# Patient Record
Sex: FEMALE | Race: WHITE | Employment: FULL TIME | URBAN - METROPOLITAN AREA
[De-identification: names, ages, dates, MRNs, and addresses within clinical notes are randomized per-mention and may not be internally consistent; named-entity substitution may affect disease eponyms.]

---

## 2021-03-12 LAB
C. TRACHOMATIS, EXTERNAL RESULT: NEGATIVE
N. GONORRHOEAE, EXTERNAL RESULT: NEGATIVE

## 2021-03-30 LAB
ABO, EXTERNAL RESULT: NORMAL
HEP B, EXTERNAL RESULT: NEGATIVE
HIV, EXTERNAL RESULT: NON REACTIVE
RH FACTOR, EXTERNAL RESULT: NEGATIVE
RPR, EXTERNAL RESULT: NON REACTIVE
RUBELLA TITER, EXTERNAL RESULT: NORMAL

## 2021-05-30 ENCOUNTER — HOSPITAL ENCOUNTER (EMERGENCY)
Age: 31
Discharge: HOME | End: 2021-05-30
Payer: COMMERCIAL

## 2021-05-30 VITALS
OXYGEN SATURATION: 99 % | HEART RATE: 82 BPM | RESPIRATION RATE: 18 BRPM | DIASTOLIC BLOOD PRESSURE: 71 MMHG | SYSTOLIC BLOOD PRESSURE: 102 MMHG | TEMPERATURE: 97.6 F

## 2021-05-30 DIAGNOSIS — O46.90 VAGINAL BLEEDING IN PREGNANCY: Primary | ICD-10-CM

## 2021-05-30 DIAGNOSIS — O26.892 RH NEGATIVE STATUS DURING PREGNANCY IN SECOND TRIMESTER: ICD-10-CM

## 2021-05-30 DIAGNOSIS — Z67.91 RH NEGATIVE STATUS DURING PREGNANCY IN SECOND TRIMESTER: ICD-10-CM

## 2021-05-30 LAB
ABO/RH: NORMAL
BASOPHILS ABSOLUTE: 0 K/UL (ref 0–0.2)
BASOPHILS RELATIVE PERCENT: 0.3 %
EOSINOPHILS ABSOLUTE: 0.1 K/UL (ref 0–0.6)
EOSINOPHILS RELATIVE PERCENT: 0.7 %
HCT VFR BLD CALC: 36.2 % (ref 36–48)
HEMOGLOBIN: 12.2 G/DL (ref 12–16)
LYMPHOCYTES ABSOLUTE: 1.8 K/UL (ref 1–5.1)
LYMPHOCYTES RELATIVE PERCENT: 13.9 %
MCH RBC QN AUTO: 31.5 PG (ref 26–34)
MCHC RBC AUTO-ENTMCNC: 33.8 G/DL (ref 31–36)
MCV RBC AUTO: 93.3 FL (ref 80–100)
MONOCYTES ABSOLUTE: 1 K/UL (ref 0–1.3)
MONOCYTES RELATIVE PERCENT: 7.7 %
NEUTROPHILS ABSOLUTE: 10.2 K/UL (ref 1.7–7.7)
NEUTROPHILS RELATIVE PERCENT: 77.4 %
PDW BLD-RTO: 12.9 % (ref 12.4–15.4)
PLATELET # BLD: 294 K/UL (ref 135–450)
PMV BLD AUTO: 7.5 FL (ref 5–10.5)
RBC # BLD: 3.89 M/UL (ref 4–5.2)
RHIG LOT NUMBER: NORMAL
WBC # BLD: 13.1 K/UL (ref 4–11)

## 2021-05-30 PROCEDURE — 96372 THER/PROPH/DIAG INJ SC/IM: CPT

## 2021-05-30 PROCEDURE — 86901 BLOOD TYPING SEROLOGIC RH(D): CPT

## 2021-05-30 PROCEDURE — 99284 EMERGENCY DEPT VISIT MOD MDM: CPT

## 2021-05-30 PROCEDURE — 85025 COMPLETE CBC W/AUTO DIFF WBC: CPT

## 2021-05-30 PROCEDURE — 6360000002 HC RX W HCPCS: Performed by: PHYSICIAN ASSISTANT

## 2021-05-30 PROCEDURE — 86900 BLOOD TYPING SEROLOGIC ABO: CPT

## 2021-05-30 RX ADMIN — HUMAN RHO(D) IMMUNE GLOBULIN 300 MCG: 300 INJECTION, SOLUTION INTRAMUSCULAR at 23:23

## 2021-05-30 ASSESSMENT — ENCOUNTER SYMPTOMS
BACK PAIN: 0
EYES NEGATIVE: 1
ABDOMINAL PAIN: 0
COLOR CHANGE: 0
SHORTNESS OF BREATH: 0

## 2021-05-31 LAB — SPECIMEN STATUS: NORMAL

## 2021-05-31 NOTE — ED PROVIDER NOTES
[address] [facility]  ED  EMERGENCY DEPARTMENT ENCOUNTER        Pt Name: Alan Albert  MRN: [id number]  Armsantoniogfhaily 1990  Date of evaluation: 5/30/2021  Provider: Alireza Murillo PA-C  PCP: No primary care provider on file. ED Attending: Milvia Wan MD      This patient was not seen by the attending provider     History provided by the patient    CHIEF COMPLAINT:     Chief Complaint   Patient presents with    Vaginal Bleeding     small amount, dripping       HISTORY OF PRESENT ILLNESS:      Alan Albert is a 27 y.o. female who arrives to the ED by private vehicle with her . Patient is here reporting that she is approximately 17 weeks pregnant. This is her fifth pregnancy. She is 6and a 8year-old at home and reports having had 2 miscarriages. She noticed some light vaginal bleeding at approximately 7:30 PM.  This was not preceded by intercourse. She had no use of tampons or injury. Patient grew concerned and immediately came to the ED. She is not having any associated abdominal or pelvic pain. She has had confirmatory ultrasounds showing IUP. She learned this past week that she is having a girl. She was simply concerned about the pregnancy and seeks evaluation and treatment. She follows with [facility]. Nursing Notes were reviewed     REVIEW OF SYSTEMS:     Review of Systems   Constitutional: Negative for activity change, appetite change, chills and fever. HENT: Negative. Eyes: Negative. Respiratory: Negative for shortness of breath. Cardiovascular: Negative for chest pain. Gastrointestinal: Negative for abdominal pain. Genitourinary: Positive for vaginal bleeding. Negative for pelvic pain. Musculoskeletal: Negative for back pain and neck pain. Skin: Negative for color change. Neurological: Negative for headaches. All other systems reviewed and are negative. Except as noted above in the ROS, all other systems were reviewed and negative.          PAST MEDICAL HISTORY:     Past Medical History:   Diagnosis Date    Rh incompatibility          SURGICAL HISTORY:      Past Surgical History:   Procedure Laterality Date    ABDOMEN SURGERY      D&C for SAB 2007         CURRENT MEDICATIONS:       Previous Medications    PRENATAL VIT-FE FUMARATE-FA (PRENATAL MULTIVITAMIN) 27-1 MG TABS TABLET    Take 1 tablet by mouth daily. ALLERGIES:    Patient has no known allergies. FAMILY HISTORY:       Family History   Problem Relation Age of Onset    High Blood Pressure Mother     High Cholesterol Father     Asthma Sister     Cancer Maternal Aunt     Miscarriages / Stillbirths Maternal Aunt     Stroke Maternal Aunt     Cancer Paternal Aunt     Cancer Paternal Grandmother           SOCIAL HISTORY:       Social History     Socioeconomic History    Marital status:      Spouse name: None    Number of children: None    Years of education: None    Highest education level: None   Occupational History    None   Tobacco Use    Smoking status: Never Smoker    Smokeless tobacco: Never Used   Substance and Sexual Activity    Alcohol use: No    Drug use: No    Sexual activity: Yes     Partners: Male   Other Topics Concern    None   Social History Narrative    None     Social Determinants of Health     Financial Resource Strain:     Difficulty of Paying Living Expenses:    Food Insecurity:     Worried About Running Out of Food in the Last Year:     Ran Out of Food in the Last Year:    Transportation Needs:     Lack of Transportation (Medical):      Lack of Transportation (Non-Medical):    Physical Activity:     Days of Exercise per Week:     Minutes of Exercise per Session:    Stress:     Feeling of Stress :    Social Connections:     Frequency of Communication with Friends and Family:     Frequency of Social Gatherings with Friends and Family:     Attends Denominational Services:     Active Member of Clubs or Organizations:     Attends Club or Organization Meetings:     Marital Status:    Intimate Partner Violence:     Fear of Current or Ex-Partner:     Emotionally Abused:     Physically Abused:     Sexually Abused:        SCREENINGS:    Ponca Coma Scale  Eye Opening: Spontaneous  Best Verbal Response: Oriented  Best Motor Response: Obeys commands  Ponca Coma Scale Score: 15        PHYSICAL EXAM:       ED Triage Vitals [05/30/21 2027]   BP Temp Temp Source Pulse Resp SpO2 Height Weight   113/79 97.6 °F (36.4 °C) Tympanic 121 16 100 % -- --       Physical Exam    CONSTITUTIONAL: Awake and alert. Cooperative. Well-developed. Well-nourished. Non-toxic. No acute distress. HENT: Normocephalic. Atraumatic. External ears normal, without discharge. No nasal discharge. Oropharynx clear. Mucous membranes moist.  EYES: Conjunctiva non-injected. No scleral icterus. PERRL. EOM's grossly intact. NECK: Supple. Normal ROM. CARDIOVASCULAR: RRR. No Murmer. Intact distal pulses. PULMONARY/CHEST WALL: Effort normal. No tachypnea. Lungs clear to ausculation. ABDOMEN: Normal BS. Soft. Nondistended. No tenderness to palpate. No guarding. /ANORECTAL: Pelvic exam done with nurse chaperone present. Normal external genitalia. On inserting speculum patient has no blood in the vaginal vault but slight blood at the cervix. Os closed. MUSKULOSKELETAL: Normal ROM. No acute deformities. No edema. No tenderness to palpate. SKIN: Warm and dry. No rash. NEUROLOGICAL: Alert and oriented x 3. GCS 15. CN II-XII grossly intact. Strength is 5/5 in all extremities and sensation is intact. Normal gait.     PSYCHIATRIC: Normal affect        DIAGNOSTICRESULTS:     LABS:    Results for orders placed or performed during the hospital encounter of 05/30/21   CBC Auto Differential   Result Value Ref Range    WBC 13.1 (H) 4.0 - 11.0 K/uL    RBC 3.89 (L) 4.00 - 5.20 M/uL    Hemoglobin 12.2 12.0 - 16.0 g/dL    Hematocrit 36.2 36.0 - 48.0 %    MCV 93.3 80.0 - 100.0 fL    MCH 31.5 26.0 - 34.0 pg    MCHC 33.8 31.0 - 36.0 g/dL    RDW 12.9 12.4 - 15.4 %    Platelets 803 720 - 642 K/uL    MPV 7.5 5.0 - 10.5 fL    Neutrophils % 77.4 %    Lymphocytes % 13.9 %    Monocytes % 7.7 %    Eosinophils % 0.7 %    Basophils % 0.3 %    Neutrophils Absolute 10.2 (H) 1.7 - 7.7 K/uL    Lymphocytes Absolute 1.8 1.0 - 5.1 K/uL    Monocytes Absolute 1.0 0.0 - 1.3 K/uL    Eosinophils Absolute 0.1 0.0 - 0.6 K/uL    Basophils Absolute 0.0 0.0 - 0.2 K/uL   ABO/RH   Result Value Ref Range    ABO/Rh A NEG          PROCEDURES:   N/A    CRITICAL CARE TIME:       None      CONSULTS:  IP CONSULT TO OB GYN      EMERGENCY DEPARTMENT COURSE and DIFFERENTIAL DIAGNOSIS/MDM:   Vitals:    Vitals:    05/30/21 2027 05/30/21 2217 05/30/21 2248   BP: 113/79 102/71    Pulse: 121  100   Resp: 16 18    Temp: 97.6 °F (36.4 °C)     TempSrc: Tympanic     SpO2: 100% 99% 99%       Patient was given the following medications:  Medications   rho(D) immune globulin (HYPERRHO S/D) injection 300 mcg (has no administration in time range)         I have evaluated this patient in the ED. Old records were reviewed. Patient is in her second trimester pregnancy and had light vaginal bleeding that started around 7:30 PM.  No associated pain. Exam done in the ED shows very minimal bleeding. Fetal heart tones are detected in the 130s. CBC reveals normal H&H at 12.2 and 36.2. ABO Rh reveals the patient to be a negative. Patient ordered RhoGam.  I consulted OB/GYN. I spoke with Dr. Ben Dobbins. I questioned whether he felt it was necessary to do a formal ultrasound given the fact that we had confirmed fetal heart tones, did a pelvic exam and based on the fact that she is already had 2 ultrasounds. He did not feel was necessary and thought pelvic rest and close outpatient follow-up in the office this week was appropriate. I agree. Patient will be discharged in stable condition following RhoGam injection.     I estimate there is LOW risk for ACUTE APPENDICITIS, PYELONEPHRITIS, PELVIC INFLAMMATORY DISEASE, PERFORATED BOWEL or ULCER,  ECTOPIC PREGNANCY, or TUBO-OVARIAN ABSCESS, thus I consider the discharge disposition reasonable. Also, there is no evidence or peritonitis, sepsis, or toxicity. Val Eisenberg and I have discussed the diagnosis and risks, and we agree with discharging home to follow-up with their primary doctor. We also discussed returning to the Emergency Department immediately if new or worsening symptoms occur. We have discussed the symptoms which are most concerning (e.g., bloody stool, fever, changing or worsening pain, vomiting) that necessitate immediate return. FINAL IMPRESSION:      1. Vaginal bleeding in pregnancy    2.  Rh negative status during pregnancy in second trimester          DISPOSITION/PLAN:   DISPOSITION     DISCHARGE    PATIENT REFERRED TO:  Iliana Ackerman MD  [address], [address] [facility]  [phone]    Call on 6/1/2021  They will reevaluate you in the office this week      DISCHARGE MEDICATIONS:  New Prescriptions    No medications on file                  (Please note thatportions of this note were completed with a voice recognition program.  Efforts were made to edit the dictations, but occasionally words are mis-transcribed.)    Mago Rico PA-C (electronicallysigned)              Antony Julio [place]  05/30/21 4137

## 2021-08-23 ENCOUNTER — HOSPITAL ENCOUNTER (OUTPATIENT)
Dept: NURSING | Age: 31
Setting detail: INFUSION SERIES
End: 2021-08-23
Payer: COMMERCIAL

## 2021-08-26 ENCOUNTER — HOSPITAL ENCOUNTER (OUTPATIENT)
Dept: NURSING | Age: 31
Setting detail: INFUSION SERIES
Discharge: HOME | End: 2021-08-26
Payer: COMMERCIAL

## 2021-08-26 VITALS
OXYGEN SATURATION: 99 % | WEIGHT: 148 LBS | RESPIRATION RATE: 20 BRPM | SYSTOLIC BLOOD PRESSURE: 102 MMHG | DIASTOLIC BLOOD PRESSURE: 63 MMHG | HEART RATE: 88 BPM | TEMPERATURE: 97.6 F | HEIGHT: 60 IN | BODY MASS INDEX: 29.06 KG/M2

## 2021-08-26 LAB — RHIG LOT NUMBER: NORMAL

## 2021-08-26 PROCEDURE — 96372 THER/PROPH/DIAG INJ SC/IM: CPT

## 2021-08-26 PROCEDURE — 6360000002 HC RX W HCPCS: Performed by: STUDENT IN AN ORGANIZED HEALTH CARE EDUCATION/TRAINING PROGRAM

## 2021-08-26 PROCEDURE — 99211 OFF/OP EST MAY X REQ PHY/QHP: CPT

## 2021-08-26 RX ORDER — FERROUS SULFATE 325(65) MG
325 TABLET ORAL
COMMUNITY

## 2021-08-26 RX ADMIN — HUMAN RHO(D) IMMUNE GLOBULIN 300 MCG: 300 INJECTION, SOLUTION INTRAMUSCULAR at 11:36

## 2021-08-27 ENCOUNTER — HOSPITAL ENCOUNTER (OUTPATIENT)
Dept: NURSING | Age: 31
Setting detail: INFUSION SERIES
End: 2021-08-27
Payer: COMMERCIAL

## 2021-10-05 LAB — GBS, EXTERNAL RESULT: NEGATIVE

## 2021-10-21 ENCOUNTER — HOSPITAL ENCOUNTER (INPATIENT)
Age: 31
LOS: 1 days | Discharge: HOME | End: 2021-10-23
Attending: OBSTETRICS & GYNECOLOGY | Admitting: OBSTETRICS & GYNECOLOGY
Payer: COMMERCIAL

## 2021-10-22 PROBLEM — Z34.90 TERM PREGNANCY: Status: ACTIVE | Noted: 2021-10-22

## 2021-10-22 LAB
ABO/RH: NORMAL
AMPHETAMINE SCREEN, URINE: NORMAL
ANTIBODY SCREEN: NORMAL
ANTIBODY SCREEN: NORMAL
BARBITURATE SCREEN URINE: NORMAL
BASOPHILS ABSOLUTE: 0 K/UL (ref 0–0.2)
BASOPHILS RELATIVE PERCENT: 0.3 %
BENZODIAZEPINE SCREEN, URINE: NORMAL
BUPRENORPHINE URINE: NORMAL
CANNABINOID SCREEN URINE: NORMAL
COCAINE METABOLITE SCREEN URINE: NORMAL
EOSINOPHILS ABSOLUTE: 0.1 K/UL (ref 0–0.6)
EOSINOPHILS RELATIVE PERCENT: 0.5 %
HCT VFR BLD CALC: 36.8 % (ref 36–48)
HEMOGLOBIN: 12.4 G/DL (ref 12–16)
LYMPHOCYTES ABSOLUTE: 2.5 K/UL (ref 1–5.1)
LYMPHOCYTES RELATIVE PERCENT: 16.9 %
Lab: NORMAL
MCH RBC QN AUTO: 30.6 PG (ref 26–34)
MCHC RBC AUTO-ENTMCNC: 33.8 G/DL (ref 31–36)
MCV RBC AUTO: 90.6 FL (ref 80–100)
METHADONE SCREEN, URINE: NORMAL
MONOCYTES ABSOLUTE: 1 K/UL (ref 0–1.3)
MONOCYTES RELATIVE PERCENT: 7 %
NEUTROPHILS ABSOLUTE: 11.2 K/UL (ref 1.7–7.7)
NEUTROPHILS RELATIVE PERCENT: 75.3 %
OPIATE SCREEN URINE: NORMAL
OXYCODONE URINE: NORMAL
PDW BLD-RTO: 15.2 % (ref 12.4–15.4)
PH UA: 7
PHENCYCLIDINE SCREEN URINE: NORMAL
PLATELET # BLD: 362 K/UL (ref 135–450)
PMV BLD AUTO: 8.3 FL (ref 5–10.5)
PROPOXYPHENE SCREEN: NORMAL
RBC # BLD: 4.06 M/UL (ref 4–5.2)
TOTAL SYPHILLIS IGG/IGM: NORMAL
WBC # BLD: 14.8 K/UL (ref 4–11)

## 2021-10-22 PROCEDURE — 86850 RBC ANTIBODY SCREEN: CPT

## 2021-10-22 PROCEDURE — 1220000000 HC SEMI PRIVATE OB R&B

## 2021-10-22 PROCEDURE — 85025 COMPLETE CBC W/AUTO DIFF WBC: CPT

## 2021-10-22 PROCEDURE — 86901 BLOOD TYPING SEROLOGIC RH(D): CPT

## 2021-10-22 PROCEDURE — 6370000000 HC RX 637 (ALT 250 FOR IP): Performed by: OBSTETRICS & GYNECOLOGY

## 2021-10-22 PROCEDURE — 7200000001 HC VAGINAL DELIVERY

## 2021-10-22 PROCEDURE — 2580000003 HC RX 258: Performed by: OBSTETRICS & GYNECOLOGY

## 2021-10-22 PROCEDURE — 86780 TREPONEMA PALLIDUM: CPT

## 2021-10-22 PROCEDURE — 86900 BLOOD TYPING SEROLOGIC ABO: CPT

## 2021-10-22 PROCEDURE — 80307 DRUG TEST PRSMV CHEM ANLYZR: CPT

## 2021-10-22 PROCEDURE — 0KQM0ZZ REPAIR PERINEUM MUSCLE, OPEN APPROACH: ICD-10-PCS | Performed by: OBSTETRICS & GYNECOLOGY

## 2021-10-22 RX ORDER — SODIUM CHLORIDE 9 MG/ML
25 INJECTION, SOLUTION INTRAVENOUS PRN
Status: DISCONTINUED | OUTPATIENT
Start: 2021-10-22 | End: 2021-10-22

## 2021-10-22 RX ORDER — DOCUSATE SODIUM 100 MG/1
100 CAPSULE, LIQUID FILLED ORAL 2 TIMES DAILY PRN
Status: DISCONTINUED | OUTPATIENT
Start: 2021-10-22 | End: 2021-10-23 | Stop reason: HOSPADM

## 2021-10-22 RX ORDER — FERROUS SULFATE 325(65) MG
325 TABLET ORAL 2 TIMES DAILY WITH MEALS
Status: DISCONTINUED | OUTPATIENT
Start: 2021-10-22 | End: 2021-10-23 | Stop reason: HOSPADM

## 2021-10-22 RX ORDER — SODIUM CHLORIDE, SODIUM LACTATE, POTASSIUM CHLORIDE, CALCIUM CHLORIDE 600; 310; 30; 20 MG/100ML; MG/100ML; MG/100ML; MG/100ML
INJECTION, SOLUTION INTRAVENOUS CONTINUOUS
Status: DISCONTINUED | OUTPATIENT
Start: 2021-10-22 | End: 2021-10-23 | Stop reason: HOSPADM

## 2021-10-22 RX ORDER — SODIUM CHLORIDE 0.9 % (FLUSH) 0.9 %
5-40 SYRINGE (ML) INJECTION PRN
Status: DISCONTINUED | OUTPATIENT
Start: 2021-10-22 | End: 2021-10-22

## 2021-10-22 RX ORDER — LANOLIN 100 %
OINTMENT (GRAM) TOPICAL PRN
Status: DISCONTINUED | OUTPATIENT
Start: 2021-10-22 | End: 2021-10-23 | Stop reason: HOSPADM

## 2021-10-22 RX ORDER — ONDANSETRON 2 MG/ML
4 INJECTION INTRAMUSCULAR; INTRAVENOUS EVERY 6 HOURS PRN
Status: DISCONTINUED | OUTPATIENT
Start: 2021-10-22 | End: 2021-10-23 | Stop reason: HOSPADM

## 2021-10-22 RX ORDER — SIMETHICONE 80 MG
80 TABLET,CHEWABLE ORAL EVERY 6 HOURS PRN
Status: DISCONTINUED | OUTPATIENT
Start: 2021-10-22 | End: 2021-10-23 | Stop reason: HOSPADM

## 2021-10-22 RX ORDER — ACETAMINOPHEN 325 MG/1
650 TABLET ORAL EVERY 4 HOURS PRN
Status: DISCONTINUED | OUTPATIENT
Start: 2021-10-22 | End: 2021-10-23 | Stop reason: HOSPADM

## 2021-10-22 RX ORDER — SODIUM CHLORIDE 9 MG/ML
25 INJECTION, SOLUTION INTRAVENOUS PRN
Status: DISCONTINUED | OUTPATIENT
Start: 2021-10-22 | End: 2021-10-23 | Stop reason: HOSPADM

## 2021-10-22 RX ORDER — SODIUM CHLORIDE, SODIUM LACTATE, POTASSIUM CHLORIDE, AND CALCIUM CHLORIDE .6; .31; .03; .02 G/100ML; G/100ML; G/100ML; G/100ML
500 INJECTION, SOLUTION INTRAVENOUS PRN
Status: DISCONTINUED | OUTPATIENT
Start: 2021-10-22 | End: 2021-10-22

## 2021-10-22 RX ORDER — SODIUM CHLORIDE 0.9 % (FLUSH) 0.9 %
5-40 SYRINGE (ML) INJECTION EVERY 12 HOURS SCHEDULED
Status: DISCONTINUED | OUTPATIENT
Start: 2021-10-22 | End: 2021-10-23 | Stop reason: HOSPADM

## 2021-10-22 RX ORDER — DIPHENHYDRAMINE HCL 25 MG
25 TABLET ORAL EVERY 4 HOURS PRN
Status: DISCONTINUED | OUTPATIENT
Start: 2021-10-22 | End: 2021-10-22

## 2021-10-22 RX ORDER — ONDANSETRON 2 MG/ML
4 INJECTION INTRAMUSCULAR; INTRAVENOUS EVERY 6 HOURS PRN
Status: DISCONTINUED | OUTPATIENT
Start: 2021-10-22 | End: 2021-10-22

## 2021-10-22 RX ORDER — SODIUM CHLORIDE 0.9 % (FLUSH) 0.9 %
5-40 SYRINGE (ML) INJECTION EVERY 12 HOURS SCHEDULED
Status: DISCONTINUED | OUTPATIENT
Start: 2021-10-22 | End: 2021-10-22

## 2021-10-22 RX ORDER — SODIUM CHLORIDE, SODIUM LACTATE, POTASSIUM CHLORIDE, AND CALCIUM CHLORIDE .6; .31; .03; .02 G/100ML; G/100ML; G/100ML; G/100ML
1000 INJECTION, SOLUTION INTRAVENOUS PRN
Status: DISCONTINUED | OUTPATIENT
Start: 2021-10-22 | End: 2021-10-22

## 2021-10-22 RX ORDER — ACETAMINOPHEN 325 MG/1
650 TABLET ORAL EVERY 4 HOURS PRN
Status: DISCONTINUED | OUTPATIENT
Start: 2021-10-22 | End: 2021-10-22

## 2021-10-22 RX ORDER — LIDOCAINE HYDROCHLORIDE 10 MG/ML
INJECTION, SOLUTION INFILTRATION; PERINEURAL
Status: DISCONTINUED
Start: 2021-10-22 | End: 2021-10-22

## 2021-10-22 RX ORDER — IBUPROFEN 800 MG/1
800 TABLET ORAL EVERY 6 HOURS PRN
Status: DISCONTINUED | OUTPATIENT
Start: 2021-10-22 | End: 2021-10-23 | Stop reason: HOSPADM

## 2021-10-22 RX ORDER — SODIUM CHLORIDE, SODIUM LACTATE, POTASSIUM CHLORIDE, CALCIUM CHLORIDE 600; 310; 30; 20 MG/100ML; MG/100ML; MG/100ML; MG/100ML
INJECTION, SOLUTION INTRAVENOUS CONTINUOUS
Status: DISCONTINUED | OUTPATIENT
Start: 2021-10-22 | End: 2021-10-22

## 2021-10-22 RX ORDER — SODIUM CHLORIDE 0.9 % (FLUSH) 0.9 %
5-40 SYRINGE (ML) INJECTION PRN
Status: DISCONTINUED | OUTPATIENT
Start: 2021-10-22 | End: 2021-10-23 | Stop reason: HOSPADM

## 2021-10-22 RX ADMIN — WITCH HAZEL 40 EACH: 500 SOLUTION RECTAL; TOPICAL at 08:06

## 2021-10-22 RX ADMIN — IBUPROFEN 800 MG: 800 TABLET, FILM COATED ORAL at 12:10

## 2021-10-22 RX ADMIN — DOCUSATE SODIUM 100 MG: 100 CAPSULE ORAL at 09:15

## 2021-10-22 RX ADMIN — IBUPROFEN 800 MG: 800 TABLET, FILM COATED ORAL at 18:05

## 2021-10-22 RX ADMIN — Medication 10 ML: at 09:16

## 2021-10-22 RX ADMIN — IBUPROFEN 800 MG: 800 TABLET, FILM COATED ORAL at 05:02

## 2021-10-22 RX ADMIN — DOCUSATE SODIUM 100 MG: 100 CAPSULE ORAL at 20:59

## 2021-10-22 RX ADMIN — BENZOCAINE AND LEVOMENTHOL: 200; 5 SPRAY TOPICAL at 08:06

## 2021-10-22 ASSESSMENT — PAIN SCALES - GENERAL
PAINLEVEL_OUTOF10: 4
PAINLEVEL_OUTOF10: 3
PAINLEVEL_OUTOF10: 3

## 2021-10-22 NOTE — PROGRESS NOTES
notified of pt arrival. Complaints of CTX that began today rating 4/10 Q2-4 mins. + FM. VE 3-4/60/-2. Orders to monitor for 2 hours and recheck.

## 2021-10-22 NOTE — PLAN OF CARE
Problem: Pain:  Goal: Pain level will decrease  Description: Pain level will decrease  10/22/2021 1842 by Toña Anderson RN  Outcome: Ongoing  10/22/2021 1842 by Toña Anderson RN  Outcome: Ongoing  10/22/2021 0621 by Catherine Stern RN  Outcome: Met This Shift  Goal: Control of acute pain  Description: Control of acute pain  10/22/2021 1842 by Toña Anderson RN  Outcome: Ongoing  10/22/2021 1842 by Toña Anderson RN  Outcome: Ongoing  10/22/2021 0621 by Catherine Stern RN  Outcome: Met This Shift  Goal: Control of chronic pain  Description: Control of chronic pain  10/22/2021 1842 by Toña Anderson RN  Outcome: Ongoing  10/22/2021 1842 by Toña Anderson RN  Outcome: Ongoing  10/22/2021 0621 by Catherine Stern RN  Outcome: Met This Shift     Problem: Discharge Planning:  Goal: Discharged to appropriate level of care  Description: Discharged to appropriate level of care  Outcome: Ongoing     Problem: Constipation:  Goal: Bowel elimination is within specified parameters  Description: Bowel elimination is within specified parameters  Outcome: Ongoing     Problem: Fluid Volume - Imbalance:  Goal: Absence of imbalanced fluid volume signs and symptoms  Description: Absence of imbalanced fluid volume signs and symptoms  Outcome: Ongoing  Goal: Absence of postpartum hemorrhage signs and symptoms  Description: Absence of postpartum hemorrhage signs and symptoms  Outcome: Ongoing     Problem: Infection - Risk of, Puerperal Infection:  Goal: Will show no infection signs and symptoms  Description: Will show no infection signs and symptoms  Outcome: Ongoing     Problem: Mood - Altered:  Goal: Mood stable  Description: Mood stable  Outcome: Ongoing     Problem: Pain - Acute:  Goal: Pain level will decrease  Description: Pain level will decrease  10/22/2021 1842 by Toña Anderson RN  Outcome: Ongoing  10/22/2021 1842 by Toña Anderson RN  Outcome: Ongoing  10/22/2021 0621 by Mookie Rose Marjory Olszewski, RN  Outcome: Met This Shift

## 2021-10-22 NOTE — PROGRESS NOTES
Pt assisted by 2 staff members to restroom for first time get up. Pt denies dizziness or lightheadedness. Gait steady. Pt voided 400 mL urine without difficulty. Pericare done by pt with RN instruction. New ice pack, pad and panties put on pt. Gown changed. Hand hygiene done. Complete linen change done and comfort pad put on bed. Pt tolerated well.

## 2021-10-22 NOTE — PROGRESS NOTES
Veto Puga called and asked to head to hospital. Pt getting more vocal with CTX.  Pt yet to get epidural.

## 2021-10-22 NOTE — PROGRESS NOTES
Brief Labor / Delivery note:     27 y.o. [id number] @ 38w2d, here for active labor. Called to room as house officer due to concern for precipitated delivery. Primary Ob en route to [address] with uncontrollable urge to push. Began pushing and with good maternal effort. The infant was delivered in the BERNARDINO position over intact perineum. Nuchal cord x 2, reduced at perineum. The shoulders and body were quickly to follow with maternal efforts. Infant was delivered with good tone and spontaneous cry without complication. Mouth and nose were bulb suctioned at maternal abdomen. At this point, primary OB arrived and took over care of patient.      Zoran Roberts, DO

## 2021-10-22 NOTE — L&D DELIVERY NOTE
Department of Obstetrics and Gynecology  Spontaneous Vaginal Delivery Note    Labor & Delivery Summary  Dilation Complete Date: 10/22/21  Dilation Complete Time: 315    Pre-operative Diagnosis:  Term pregnancy    Post-operative Diagnosis:  same    Procedure:  Spontaneous vaginal delivery or Repair second degree spontaneous laceration    Surgeon:   Dr. Desean Douglass, Dr. Selin Bynum for the patient's :  Nader Mcintosh [[id number]]          Anesthesia:  none    Estimated blood loss:  300ml    Specimen:  Placenta not sent to pathology     Cord blood sent No    Complications:  none    Condition:  mother stable    Details of Procedure: The patient is a 27 y.o. female at 36w4d   OB History        5    Para   3    Term   2       0    AB   1    Living   1       SAB   1    TAB   0    Ectopic   0    Molar        Multiple   0    Live Births   1             who was admitted for active phase labor. She received the following interventions: none She was known to be GBS negative and did not receive antibiotic prophylaxis. The patient progressed well,did not receive an epidural, felt the urge to push and had a precipitous delivery of a viable female infant,  the fetus delivered atraumatically, nuchal cord x 2 reduced at perineum, and the infant was placed on mother abdomen. Cord was clamped and cut. The infant was evaluated at the bedside by the waiting nurse. The delivery of the placenta was spontaneous and intact. The perineum and vagina were explored and a second degree laceration was repaired in standard fashion with 3 O Vircryl after injection of 20 cc of Lidocaine locally. Apgars 8 and 9 at one and five minutes respectively.

## 2021-10-22 NOTE — H&P
Department of Obstetrics and Gynecology  Labor and Delivery   Attending Progress Note      SUBJECTIVE:  Patient admitted in active labor    OBJECTIVE:      Fetal heart rate: upon admission       Baseline Heart Rate:  130        Accelerations:  present       Long Term Variability:  moderate       Decelerations:  absent         Contraction frequency: 3 minutes    Membranes:  Intact    Cervix: at admission         Dilation:  3 - 4         Effacement:  60%         Station:  -2             ASSESSMENT & PLAN:    27 y.o.    OB History        5    Para   3    Term   2       0    AB   1    Living   1       SAB   1    TAB   0    Ectopic   0    Molar        Multiple   0    Live Births   1             38w2d  1. FWB: reassuring  2. ACOG reviewed.   Rh-, GBS-

## 2021-10-23 VITALS
DIASTOLIC BLOOD PRESSURE: 79 MMHG | BODY MASS INDEX: 25.83 KG/M2 | TEMPERATURE: 98.2 F | HEIGHT: 65 IN | SYSTOLIC BLOOD PRESSURE: 118 MMHG | HEART RATE: 55 BPM | WEIGHT: 155 LBS | RESPIRATION RATE: 16 BRPM

## 2021-10-23 PROBLEM — Z34.90 TERM PREGNANCY: Status: RESOLVED | Noted: 2021-10-22 | Resolved: 2021-10-23

## 2021-10-23 LAB
ABO/RH: NORMAL
FETAL SCREEN: NORMAL
HCT VFR BLD CALC: 29.3 % (ref 36–48)
HEMOGLOBIN: 10 G/DL (ref 12–16)
MCH RBC QN AUTO: 30.6 PG (ref 26–34)
MCHC RBC AUTO-ENTMCNC: 34 G/DL (ref 31–36)
MCV RBC AUTO: 90.1 FL (ref 80–100)
PDW BLD-RTO: 15.5 % (ref 12.4–15.4)
PLATELET # BLD: 299 K/UL (ref 135–450)
PMV BLD AUTO: 8.1 FL (ref 5–10.5)
RBC # BLD: 3.25 M/UL (ref 4–5.2)
RHIG LOT NUMBER: NORMAL
WBC # BLD: 13.4 K/UL (ref 4–11)

## 2021-10-23 PROCEDURE — 85461 HEMOGLOBIN FETAL: CPT

## 2021-10-23 PROCEDURE — 6360000002 HC RX W HCPCS: Performed by: OBSTETRICS & GYNECOLOGY

## 2021-10-23 PROCEDURE — 96372 THER/PROPH/DIAG INJ SC/IM: CPT

## 2021-10-23 PROCEDURE — 86900 BLOOD TYPING SEROLOGIC ABO: CPT

## 2021-10-23 PROCEDURE — 6370000000 HC RX 637 (ALT 250 FOR IP): Performed by: OBSTETRICS & GYNECOLOGY

## 2021-10-23 PROCEDURE — 85027 COMPLETE CBC AUTOMATED: CPT

## 2021-10-23 PROCEDURE — 36415 COLL VENOUS BLD VENIPUNCTURE: CPT

## 2021-10-23 PROCEDURE — 86901 BLOOD TYPING SEROLOGIC RH(D): CPT

## 2021-10-23 RX ADMIN — IBUPROFEN 800 MG: 800 TABLET, FILM COATED ORAL at 08:52

## 2021-10-23 RX ADMIN — HUMAN RHO(D) IMMUNE GLOBULIN 300 MCG: 300 INJECTION, SOLUTION INTRAMUSCULAR at 10:31

## 2021-10-23 RX ADMIN — IBUPROFEN 800 MG: 800 TABLET, FILM COATED ORAL at 01:12

## 2021-10-23 RX ADMIN — DOCUSATE SODIUM 100 MG: 100 CAPSULE ORAL at 08:52

## 2021-10-23 ASSESSMENT — PAIN SCALES - GENERAL
PAINLEVEL_OUTOF10: 3
PAINLEVEL_OUTOF10: 2

## 2021-10-23 NOTE — FLOWSHEET NOTE
Infant care teaching completed and forms signed by patient. Copy witnessed by RN and given to patient. Parent/Care giver verbalized understanding of all teaching points. Infant care teaching completed and forms signed by mother of infant. Copy witnessed by RN and given to patient. Parent/Care giver verbalized understanding of all teaching points. Parent/Care giver plans to follow-up with Pediatrician as instructed.   Parent/Care giver discharged via wheelchair with infant in arms
explain __________________________  13. Do you have any other questions or concerns I can address today?  Y/N  __________________________________________________      Teaching During interview :_____________________________________________  ___________________________RN       Date:______________Time:________________

## 2021-10-23 NOTE — DISCHARGE SUMMARY
Department of Obstetrics and Gynecology  Postpartum Discharge Summary      Admit Date: 10/21/2021    Admit Diagnosis: Term pregnancy [Z34.90]    Discharge Date: 10/23/21    Condition at Discharge: good    Discharge Diagnoses: spontaneous vaginal delivery    Discharge Disposition:  Home    Service: Obstetrics    Postpartum complications: none     Hospital Course: uncomplicated     Data:  Information for the patient's :  Nader Mcintosh [[id number]]        Weight   Information for the patient's :  Nader Mcintosh [[id number]]        Apgars   Information for the patient's :  Phoebe Stiles Girl Phylliss Situ [[id number]]         Disposition of Baby:  Home with mother      Current Discharge Medication List      CONTINUE these medications which have NOT CHANGED    Details   ferrous sulfate (IRON 325) 325 (65 Fe) MG tablet Take 325 mg by mouth daily (with breakfast)      Prenatal Vit-Fe Fumarate-FA (PRENATAL MULTIVITAMIN) 27-1 MG TABS tablet Take 1 tablet by mouth daily. Follow-up 6 weeks in office.         Electronically signed by Baker Phoenix, MD on 10/23/2021 at 10:08 AM

## 2021-10-23 NOTE — PROGRESS NOTES
Department of Obstetrics and Gynecology  Labor and Delivery  Attending Post Partum Progress Note      SUBJECTIVE:  No probs. Lochia normal. Infant is well. Pt desires discharge today. OBJECTIVE:      Vitals:  /79   Pulse 55   Temp 98.2 °F (36.8 °C) (Oral)   Resp 16   Ht 5' 5\" (1.651 m)   Wt 155 lb (70.3 kg)   Breastfeeding Unknown   BMI 25.79 kg/m²     ABDOMEN:  FFNT  GENITAL/URINARY:  Deferred  EXT:  NT    DATA:    CBC:    Lab Results   Component Value Date    WBC 13.4 10/23/2021    RBC 3.25 10/23/2021    HGB 10.0 10/23/2021    HCT 29.3 10/23/2021    MCV 90.1 10/23/2021    RDW 15.5 10/23/2021     10/23/2021       ASSESSMENT & PLAN:      IMP:  PPD#1 S/p , doing well. PLAN:  Home. Instructed. Declines Rx. F/U 6 weeks in office.     Jimmy Marie MD

## 2021-10-23 NOTE — PLAN OF CARE
Problem: Pain:  Goal: Pain level will decrease  Description: Pain level will decrease  10/22/2021 2019 by Ulises Rausch RN  Outcome: Ongoing  10/22/2021 1842 by Sujata Farley RN  Outcome: Ongoing  10/22/2021 1842 by Sujata Farley RN  Outcome: Ongoing  10/22/2021 0621 by Jahaira Tate RN  Outcome: Met This Shift  Goal: Control of acute pain  Description: Control of acute pain  10/22/2021 2019 by Ulises Rausch RN  Outcome: Ongoing  10/22/2021 1842 by Sujata Farley RN  Outcome: Ongoing  10/22/2021 1842 by Sujata Farley RN  Outcome: Ongoing  10/22/2021 0621 by Jahaira Tate RN  Outcome: Met This Shift  Goal: Control of chronic pain  Description: Control of chronic pain  10/22/2021 2019 by Ulises Rausch RN  Outcome: Ongoing  10/22/2021 1842 by Sujata Farley RN  Outcome: Ongoing  10/22/2021 1842 by Sujata Farley RN  Outcome: Ongoing  10/22/2021 0621 by Jahaira Tate RN  Outcome: Met This Shift     Problem: Discharge Planning:  Goal: Discharged to appropriate level of care  Description: Discharged to appropriate level of care  10/22/2021 2019 by Ulises Rausch RN  Outcome: Ongoing  10/22/2021 1842 by Sujata Farley RN  Outcome: Ongoing     Problem: Constipation:  Goal: Bowel elimination is within specified parameters  Description: Bowel elimination is within specified parameters  10/22/2021 2019 by Ulises Rausch RN  Outcome: Ongoing  10/22/2021 1842 by Sujata Farley RN  Outcome: Ongoing     Problem: Fluid Volume - Imbalance:  Goal: Absence of imbalanced fluid volume signs and symptoms  Description: Absence of imbalanced fluid volume signs and symptoms  10/22/2021 2019 by Ulises Rausch RN  Outcome: Ongoing  10/22/2021 1842 by Sujata Farley RN  Outcome: Ongoing  Goal: Absence of postpartum hemorrhage signs and symptoms  Description: Absence of postpartum hemorrhage signs and symptoms  10/22/2021 2019 by Ulises Rausch RN  Outcome: Ongoing  10/22/2021 1842 by Celestina Alcantara RN  Outcome: Ongoing     Problem: Infection - Risk of, Puerperal Infection:  Goal: Will show no infection signs and symptoms  Description: Will show no infection signs and symptoms  10/22/2021 2019 by Ruby Harris RN  Outcome: Ongoing  10/22/2021 1842 by Celestina Alcantara RN  Outcome: Ongoing     Problem: Mood - Altered:  Goal: Mood stable  Description: Mood stable  10/22/2021 2019 by Ruby Harris RN  Outcome: Ongoing  10/22/2021 1842 by Celestina Alcantara RN  Outcome: Ongoing     Problem: Pain - Acute:  Goal: Pain level will decrease  Description: Pain level will decrease  10/22/2021 2019 by Ruby Harris RN  Outcome: Ongoing  10/22/2021 1842 by Celestina Alcantara RN  Outcome: Ongoing  10/22/2021 1842 by Celestina Alcantara RN  Outcome: Ongoing  10/22/2021 0621 by Ngoc Gruber RN  Outcome: Met This Shift

## 2021-10-26 ENCOUNTER — HOSPITAL ENCOUNTER (INPATIENT)
Age: 31
LOS: 1 days | Discharge: HOME | DRG: 776 | End: 2021-10-28
Attending: EMERGENCY MEDICINE | Admitting: OBSTETRICS & GYNECOLOGY
Payer: COMMERCIAL

## 2021-10-26 ENCOUNTER — APPOINTMENT (OUTPATIENT)
Dept: CT IMAGING | Age: 31
DRG: 776 | End: 2021-10-26
Payer: COMMERCIAL

## 2021-10-26 LAB
A/G RATIO: 0.9 (ref 1.1–2.2)
ALBUMIN SERPL-MCNC: 3.3 G/DL (ref 3.4–5)
ALP BLD-CCNC: 271 U/L (ref 40–129)
ALT SERPL-CCNC: 104 U/L (ref 10–40)
ANION GAP SERPL CALCULATED.3IONS-SCNC: 12 MMOL/L (ref 3–16)
AST SERPL-CCNC: 184 U/L (ref 15–37)
BASOPHILS ABSOLUTE: 0 K/UL (ref 0–0.2)
BASOPHILS RELATIVE PERCENT: 0.5 %
BILIRUB SERPL-MCNC: <0.2 MG/DL (ref 0–1)
BUN BLDV-MCNC: 16 MG/DL (ref 7–20)
CALCIUM SERPL-MCNC: 8.7 MG/DL (ref 8.3–10.6)
CHLORIDE BLD-SCNC: 103 MMOL/L (ref 99–110)
CO2: 20 MMOL/L (ref 21–32)
CREAT SERPL-MCNC: 0.6 MG/DL (ref 0.6–1.1)
EOSINOPHILS ABSOLUTE: 0.1 K/UL (ref 0–0.6)
EOSINOPHILS RELATIVE PERCENT: 0.8 %
GFR AFRICAN AMERICAN: >60
GFR NON-AFRICAN AMERICAN: >60
GLOBULIN: 3.7 G/DL
GLUCOSE BLD-MCNC: 92 MG/DL (ref 70–99)
HCT VFR BLD CALC: 31.7 % (ref 36–48)
HEMOGLOBIN: 10.5 G/DL (ref 12–16)
LYMPHOCYTES ABSOLUTE: 0.9 K/UL (ref 1–5.1)
LYMPHOCYTES RELATIVE PERCENT: 11.3 %
MCH RBC QN AUTO: 29.7 PG (ref 26–34)
MCHC RBC AUTO-ENTMCNC: 33.1 G/DL (ref 31–36)
MCV RBC AUTO: 89.9 FL (ref 80–100)
MONOCYTES ABSOLUTE: 0.8 K/UL (ref 0–1.3)
MONOCYTES RELATIVE PERCENT: 9.5 %
NEUTROPHILS ABSOLUTE: 6.2 K/UL (ref 1.7–7.7)
NEUTROPHILS RELATIVE PERCENT: 77.9 %
PDW BLD-RTO: 15.3 % (ref 12.4–15.4)
PLATELET # BLD: 348 K/UL (ref 135–450)
PMV BLD AUTO: 7.9 FL (ref 5–10.5)
POTASSIUM REFLEX MAGNESIUM: 3.9 MMOL/L (ref 3.5–5.1)
PRO-BNP: 844 PG/ML (ref 0–124)
RBC # BLD: 3.53 M/UL (ref 4–5.2)
SODIUM BLD-SCNC: 135 MMOL/L (ref 136–145)
TOTAL PROTEIN: 7 G/DL (ref 6.4–8.2)
TROPONIN: <0.01 NG/ML
WBC # BLD: 7.9 K/UL (ref 4–11)

## 2021-10-26 PROCEDURE — 80053 COMPREHEN METABOLIC PANEL: CPT

## 2021-10-26 PROCEDURE — 99283 EMERGENCY DEPT VISIT LOW MDM: CPT

## 2021-10-26 PROCEDURE — 85025 COMPLETE CBC W/AUTO DIFF WBC: CPT

## 2021-10-26 PROCEDURE — 93005 ELECTROCARDIOGRAM TRACING: CPT | Performed by: EMERGENCY MEDICINE

## 2021-10-26 PROCEDURE — 71260 CT THORAX DX C+: CPT

## 2021-10-26 PROCEDURE — 83880 ASSAY OF NATRIURETIC PEPTIDE: CPT

## 2021-10-26 PROCEDURE — 84484 ASSAY OF TROPONIN QUANT: CPT

## 2021-10-26 PROCEDURE — 6360000004 HC RX CONTRAST MEDICATION: Performed by: PHYSICIAN ASSISTANT

## 2021-10-26 PROCEDURE — 83735 ASSAY OF MAGNESIUM: CPT

## 2021-10-26 RX ADMIN — IOPAMIDOL 75 ML: 755 INJECTION, SOLUTION INTRAVENOUS at 21:54

## 2021-10-26 ASSESSMENT — PAIN SCALES - GENERAL: PAINLEVEL_OUTOF10: 4

## 2021-10-26 ASSESSMENT — PAIN DESCRIPTION - ORIENTATION: ORIENTATION: MID

## 2021-10-26 ASSESSMENT — PAIN DESCRIPTION - LOCATION: LOCATION: ABDOMEN

## 2021-10-27 LAB
BACTERIA: ABNORMAL /HPF
BILIRUBIN URINE: NEGATIVE
BLOOD, URINE: ABNORMAL
C-REACTIVE PROTEIN: 18.7 MG/L (ref 0–5.1)
CLARITY: CLEAR
COLOR: YELLOW
CREATININE URINE: 20.7 MG/DL (ref 28–259)
EPITHELIAL CELLS, UA: ABNORMAL /HPF (ref 0–5)
GLUCOSE URINE: NEGATIVE MG/DL
HAPTOGLOBIN: 124 MG/DL (ref 30–200)
KETONES, URINE: NEGATIVE MG/DL
LACTATE DEHYDROGENASE: 280 U/L (ref 100–190)
LEUKOCYTE ESTERASE, URINE: ABNORMAL
LV EF: 55 %
LVEF MODALITY: NORMAL
MAGNESIUM: 2.1 MG/DL (ref 1.8–2.4)
MICROSCOPIC EXAMINATION: YES
NITRITE, URINE: NEGATIVE
PH UA: 7 (ref 5–8)
PROTEIN PROTEIN: 16 MG/DL
PROTEIN UA: NEGATIVE MG/DL
PROTEIN/CREAT RATIO: 0.8 MG/DL
RBC UA: ABNORMAL /HPF (ref 0–4)
SARS-COV-2, NAAT: DETECTED
SEDIMENTATION RATE, ERYTHROCYTE: 58 MM/HR (ref 0–20)
SPECIFIC GRAVITY UA: <=1.005 (ref 1–1.03)
TROPONIN: <0.01 NG/ML
TSH REFLEX: 1.12 UIU/ML (ref 0.27–4.2)
URINE TYPE: ABNORMAL
UROBILINOGEN, URINE: 0.2 E.U./DL
WBC UA: ABNORMAL /HPF (ref 0–5)

## 2021-10-27 PROCEDURE — 81001 URINALYSIS AUTO W/SCOPE: CPT

## 2021-10-27 PROCEDURE — 6360000002 HC RX W HCPCS: Performed by: OBSTETRICS & GYNECOLOGY

## 2021-10-27 PROCEDURE — 85652 RBC SED RATE AUTOMATED: CPT

## 2021-10-27 PROCEDURE — 83010 ASSAY OF HAPTOGLOBIN QUANT: CPT

## 2021-10-27 PROCEDURE — 83615 LACTATE (LD) (LDH) ENZYME: CPT

## 2021-10-27 PROCEDURE — 84443 ASSAY THYROID STIM HORMONE: CPT

## 2021-10-27 PROCEDURE — 82570 ASSAY OF URINE CREATININE: CPT

## 2021-10-27 PROCEDURE — 99284 EMERGENCY DEPT VISIT MOD MDM: CPT | Performed by: INTERNAL MEDICINE

## 2021-10-27 PROCEDURE — 93005 ELECTROCARDIOGRAM TRACING: CPT | Performed by: INTERNAL MEDICINE

## 2021-10-27 PROCEDURE — 96374 THER/PROPH/DIAG INJ IV PUSH: CPT

## 2021-10-27 PROCEDURE — 6360000002 HC RX W HCPCS: Performed by: PHYSICIAN ASSISTANT

## 2021-10-27 PROCEDURE — 2580000003 HC RX 258: Performed by: OBSTETRICS & GYNECOLOGY

## 2021-10-27 PROCEDURE — 87635 SARS-COV-2 COVID-19 AMP PRB: CPT

## 2021-10-27 PROCEDURE — 93306 TTE W/DOPPLER COMPLETE: CPT

## 2021-10-27 PROCEDURE — 1220000000 HC SEMI PRIVATE OB R&B

## 2021-10-27 PROCEDURE — 86140 C-REACTIVE PROTEIN: CPT

## 2021-10-27 PROCEDURE — 6370000000 HC RX 637 (ALT 250 FOR IP): Performed by: INTERNAL MEDICINE

## 2021-10-27 PROCEDURE — 84484 ASSAY OF TROPONIN QUANT: CPT

## 2021-10-27 PROCEDURE — 6360000002 HC RX W HCPCS: Performed by: INTERNAL MEDICINE

## 2021-10-27 PROCEDURE — 36415 COLL VENOUS BLD VENIPUNCTURE: CPT

## 2021-10-27 PROCEDURE — 84156 ASSAY OF PROTEIN URINE: CPT

## 2021-10-27 RX ORDER — ACETAMINOPHEN 325 MG/1
650 TABLET ORAL EVERY 6 HOURS PRN
Status: DISCONTINUED | OUTPATIENT
Start: 2021-10-27 | End: 2021-10-28 | Stop reason: HOSPADM

## 2021-10-27 RX ORDER — MULTIVIT-MIN 60/IRON FUM/FOLIC 27 MG-1 MG
1 TABLET ORAL DAILY
Status: DISCONTINUED | OUTPATIENT
Start: 2021-10-27 | End: 2021-10-27 | Stop reason: CLARIF

## 2021-10-27 RX ORDER — MAGNESIUM SULFATE IN WATER 40 MG/ML
4000 INJECTION, SOLUTION INTRAVENOUS ONCE
Status: COMPLETED | OUTPATIENT
Start: 2021-10-27 | End: 2021-10-27

## 2021-10-27 RX ORDER — FUROSEMIDE 10 MG/ML
20 INJECTION INTRAMUSCULAR; INTRAVENOUS ONCE
Status: COMPLETED | OUTPATIENT
Start: 2021-10-27 | End: 2021-10-27

## 2021-10-27 RX ORDER — SODIUM CHLORIDE 9 MG/ML
25 INJECTION, SOLUTION INTRAVENOUS PRN
Status: DISCONTINUED | OUTPATIENT
Start: 2021-10-27 | End: 2021-10-28 | Stop reason: HOSPADM

## 2021-10-27 RX ORDER — LABETALOL HYDROCHLORIDE 5 MG/ML
10 INJECTION, SOLUTION INTRAVENOUS EVERY 4 HOURS PRN
Status: DISCONTINUED | OUTPATIENT
Start: 2021-10-27 | End: 2021-10-28 | Stop reason: HOSPADM

## 2021-10-27 RX ORDER — LABETALOL HYDROCHLORIDE 5 MG/ML
10 INJECTION, SOLUTION INTRAVENOUS EVERY 4 HOURS PRN
Status: DISCONTINUED | OUTPATIENT
Start: 2021-10-27 | End: 2021-10-27

## 2021-10-27 RX ORDER — MAGNESIUM SULFATE 1 G/100ML
1000 INJECTION INTRAVENOUS
Status: DISCONTINUED | OUTPATIENT
Start: 2021-10-27 | End: 2021-10-27

## 2021-10-27 RX ORDER — PRENATAL WITH FERROUS FUM AND FOLIC ACID 3080; 920; 120; 400; 22; 1.84; 3; 20; 10; 1; 12; 200; 27; 25; 2 [IU]/1; [IU]/1; MG/1; [IU]/1; MG/1; MG/1; MG/1; MG/1; MG/1; MG/1; UG/1; MG/1; MG/1; MG/1; MG/1
1 TABLET ORAL DAILY
Status: DISCONTINUED | OUTPATIENT
Start: 2021-10-28 | End: 2021-10-28 | Stop reason: HOSPADM

## 2021-10-27 RX ORDER — SODIUM CHLORIDE 0.9 % (FLUSH) 0.9 %
10 SYRINGE (ML) INJECTION PRN
Status: DISCONTINUED | OUTPATIENT
Start: 2021-10-27 | End: 2021-10-28 | Stop reason: HOSPADM

## 2021-10-27 RX ORDER — MAGNESIUM SULFATE IN WATER 40 MG/ML
2000 INJECTION, SOLUTION INTRAVENOUS PRN
Status: DISCONTINUED | OUTPATIENT
Start: 2021-10-27 | End: 2021-10-28 | Stop reason: HOSPADM

## 2021-10-27 RX ORDER — POTASSIUM CHLORIDE 7.45 MG/ML
10 INJECTION INTRAVENOUS PRN
Status: DISCONTINUED | OUTPATIENT
Start: 2021-10-27 | End: 2021-10-28 | Stop reason: HOSPADM

## 2021-10-27 RX ORDER — ONDANSETRON 2 MG/ML
4 INJECTION INTRAMUSCULAR; INTRAVENOUS EVERY 6 HOURS PRN
Status: DISCONTINUED | OUTPATIENT
Start: 2021-10-27 | End: 2021-10-28 | Stop reason: HOSPADM

## 2021-10-27 RX ORDER — SODIUM CHLORIDE 0.9 % (FLUSH) 0.9 %
10 SYRINGE (ML) INJECTION EVERY 12 HOURS SCHEDULED
Status: DISCONTINUED | OUTPATIENT
Start: 2021-10-27 | End: 2021-10-28 | Stop reason: HOSPADM

## 2021-10-27 RX ORDER — FERROUS SULFATE 325(65) MG
325 TABLET ORAL
Status: DISCONTINUED | OUTPATIENT
Start: 2021-10-27 | End: 2021-10-28 | Stop reason: HOSPADM

## 2021-10-27 RX ORDER — ACETAMINOPHEN 650 MG/1
650 SUPPOSITORY RECTAL EVERY 6 HOURS PRN
Status: DISCONTINUED | OUTPATIENT
Start: 2021-10-27 | End: 2021-10-28 | Stop reason: HOSPADM

## 2021-10-27 RX ORDER — SODIUM CHLORIDE, SODIUM LACTATE, POTASSIUM CHLORIDE, CALCIUM CHLORIDE 600; 310; 30; 20 MG/100ML; MG/100ML; MG/100ML; MG/100ML
INJECTION, SOLUTION INTRAVENOUS CONTINUOUS
Status: DISCONTINUED | OUTPATIENT
Start: 2021-10-27 | End: 2021-10-27

## 2021-10-27 RX ORDER — PROMETHAZINE HYDROCHLORIDE 25 MG/1
12.5 TABLET ORAL EVERY 6 HOURS PRN
Status: DISCONTINUED | OUTPATIENT
Start: 2021-10-27 | End: 2021-10-28 | Stop reason: HOSPADM

## 2021-10-27 RX ADMIN — FUROSEMIDE 20 MG: 10 INJECTION, SOLUTION INTRAMUSCULAR; INTRAVENOUS at 01:41

## 2021-10-27 RX ADMIN — MAGNESIUM SULFATE HEPTAHYDRATE 1000 MG: 1 INJECTION, SOLUTION INTRAVENOUS at 03:09

## 2021-10-27 RX ADMIN — SODIUM CHLORIDE, POTASSIUM CHLORIDE, SODIUM LACTATE AND CALCIUM CHLORIDE: 600; 310; 30; 20 INJECTION, SOLUTION INTRAVENOUS at 05:10

## 2021-10-27 RX ADMIN — ACETAMINOPHEN 650 MG: 325 TABLET ORAL at 20:01

## 2021-10-27 RX ADMIN — MAGNESIUM SULFATE HEPTAHYDRATE 4000 MG: 40 INJECTION, SOLUTION INTRAVENOUS at 05:13

## 2021-10-27 RX ADMIN — MAGNESIUM SULFATE HEPTAHYDRATE 2000 MG/HR: 40 INJECTION, SOLUTION INTRAVENOUS at 05:38

## 2021-10-27 RX ADMIN — MAGNESIUM SULFATE HEPTAHYDRATE 2000 MG/HR: 40 INJECTION, SOLUTION INTRAVENOUS at 16:16

## 2021-10-27 ASSESSMENT — PAIN SCALES - GENERAL: PAINLEVEL_OUTOF10: 1

## 2021-10-27 NOTE — PROGRESS NOTES
[facility] on call [facility] Cardiology group contacted via [employer] regarding cardiology consultation request by OMAR Sood MD

## 2021-10-27 NOTE — PROGRESS NOTES
Called and clarified MagSO4 order w/ Dr. Narciso Segura. New orders rec'd at this time. See new orders.   Makenzie Santoro RN

## 2021-10-27 NOTE — DISCHARGE INSTR - DIET
 Good nutrition is important when healing from an illness, injury, or surgery. Follow any nutrition recommendations given to you during your hospital stay.  If you were given an oral nutrition supplement while in the hospital, continue to take this supplement at home. You can take it with meals, in-between meals, and/or before bedtime. These supplements can be purchased at most local grocery stores, pharmacies, and chain super-stores.  If you have any questions about your diet or nutrition, call the hospital and ask for the dietitian. Heart Healthy, Reduced Sodium Nutrition    A heart-healthy diet is recommended to reduce your unhealthy blood cholesterol levels, manage high blood pressure, and lower your risk for heart disease. To follow a heart-healthy diet,   Eat a balanced diet with whole grains, fruits and vegetables, and lean protein sources.  Achieve and maintain a healthy weight.  Choose heart-healthy unsaturated fats. Limit saturated fats, trans fats, and cholesterol intake. Eat more plant-based or vegetarian meals using beans and soy foods for protein.  Eat whole, unprocessed foods to limit the amount of sodium (salt) you eat.  Limit refined carbohydrates especially sugar, sweets and sugar-sweetened beverages.  If you drink alcohol, do so in moderation: one serving per day (women) and two servings per day (men). o One serving is equivalent to 12 ounces beer, 5 ounces wine, or 1.5 ounces distilled spirits       Tips  Tips for Choosing Heart-Healthy Fats  Choose lean protein and low-fat dairy foods to reduce saturated fat intake.  Saturated fat is usually found in animal-based protein and is associated with certain health risks. Saturated fat is the biggest contributor to raised low-density lipoprotein (LDL) cholesterol levels in the diet. Research shows that limiting saturated fat lowers unhealthy cholesterol levels.  Eat no more than 7% of your total calories each day from saturated fat. Ask your RDN to help you determine how much saturated fat is right for you.  There are many foods that do not contain large amounts of saturated fats. Swapping these foods to replace foods high in saturated fats will help you limit the saturated fat you eat and improve your cholesterol levels. You can also try eating more plant-based or vegetarian meals. Instead of Try:   Whole milk, cheese, yogurt, and ice cream 1%, ½%, or skim milk, low-fat cheese, non-fat yogurt, and low-fat ice cream   Fatty, marbled beef and pork Lean beef, pork, or venison   Poultry with skin Poultry without skin   Butter, stick margarine Reduced-fat, whipped, or liquid spreads   Coconut oil, palm oil Liquid vegetable oils: corn, canola, olive, soybean and safflower oils      Avoid trans fats.  Trans fats increase levels of LDL-cholesterol. Hydrogenated fat in processed foods is the main source of trans fats in foods.  Trans fats can be found in stick margarine, shortening, processed sweets, baked goods, some fried foods, and packaged foods made with hydrogenated oils. Avoid foods with partially hydrogenated oil on the ingredient list such as: cookies, pastries, baked goods, biscuits, crackers, microwave popcorn, and frozen dinners. Choose foods with heart healthy fats.  Polyunsaturated and monounsaturated fat are unsaturated fats that may help lower your blood cholesterol level when used in place of saturated fat in your diet.  Ask your RDN about taking a dietary supplement with plant sterols and stanols to help lower your cholesterol level.  Research shows that substituting saturated fats with unsaturated fats is beneficial to cholesterol levels. Try these easy swaps:      Instead of Try:   Butter, stick margarine, or solid shortening Reduced-fat, whipped, or liquid spreads   Beef, pork, or poultry with skin    Fish and seafood   Chips, crackers, snack foods Raw or unsalted nuts and seeds or nut butters  Hummus with vegetables  Avocado on toast   Coconut oil, palm oil Liquid vegetable oils: corn, canola, olive, soybean and safflower oils      Limit the amount of cholesterol you eat to less than 200 milligrams per day.  Cholesterol is a substance carried through the bloodstream via lipoproteins, which are known as transporters of fat. Some body functions need cholesterol to work properly, but too much cholesterol in the bloodstream can damage arteries and build up blood vessel linings (which can lead to heart attack and stroke). You should eat less than 200 milligrams cholesterol per day.  People respond differently to eating cholesterol. There is no test available right now that can figure out which people will respond more to dietary cholesterol and which will respond less. For individuals with high intake of dietary cholesterol, different types of increase (none, small, moderate, large) in LDL-cholesterol levels are all possible.  Food sources of cholesterol include egg yolks and organ meats such as liver, gizzards. Limit egg yolks to two to four per week and avoid organ meats like liver and gizzards to control cholesterol intake. Tips for Choosing Heart-Healthy Carbohydrates  Consume foods rich in viscous (soluble) fiber   Viscous, or soluble, fiber is found in the walls of plant cells. Viscous fiber is found only in plant-based foods--animal-based foods like meat or dairy products do not contain fiber. In the stomach, viscous fibers absorb water and swell to form a thick, jelly-like mass. This helps to lower your unhealthy cholesterol     o Rich sources of viscous fiber include asparagus, [place] sprouts, sweet potatoes, turnips, apricots, mangoes, oranges, legumes, barley, oats, and oat bran.  Eat at least 5 to 10 grams of viscous fiber each day. As you increase your fiber intake gradually, also increase the amount of water you drink. This will help prevent constipation.    If you have difficulty achieving this goal, ask your RDN about fiber laxatives. Choose fiber supplements made with viscous fibers such as psyllium seed husks or methylcellulose to help lower unhealthy cholesterol. Limit refined carbohydrates   There are three types of carbohydrates: starches, sugar, and fiber. Some carbohydrates occur naturally in food, like the starches in rice or corn or the sugars in fruits and milk. Refined carbohydrates--foods with high amounts of simple sugars--can raise triglyceride levels. High triglyceride levels are associated with coronary heart disease.  Some examples of refined carbohydrate foods are table sugar, sweets, and beverages sweetened with added sugar. Tips for Reducing Sodium (Salt)  Although sodium is important for your body to function, too much sodium can be harmful for people with high blood pressure. As sodium and fluid buildup in your tissues and bloodstream, your blood pressure increases. High blood pressure may cause damage to other organs and increase your risk for a stroke. Even if you take a pill for blood pressure or a water pill (diuretic) to remove fluid, it is still important to have less salt in your diet. Ask your doctor and RDN what amount of sodium is right for you.  Avoid processed foods. Eat more fresh foods. o Fresh fruits and vegetables are naturally low in sodium, as well as frozen vegetables and fruits that have no added juices or sauces. o Fresh meats are lower in sodium than processed meats, such as bravo, sausage, and hotdogs. Read the nutrition label or ask your  to help you find a fresh meat that is low in sodium.  Eat less salt--at the table and when cooking. o A single teaspoon of table salt has 2,300 mg of sodium. o Leave the salt out of recipes for pasta, casseroles, and soups. o Ask your RDN how to cook your favorite recipes without sodium   Be a smart . o Look for food packages that say salt-free or sodium-free.  These items contain less than 5 milligrams of sodium per serving. o Very low-sodium products contain less than 35 milligrams of sodium per serving. o Low-sodium products contain less than 140 milligrams of sodium per serving. o Beware of reduced salt or reduced sodium products. These items may still be high in sodium. Check the nutrition label.  Add flavors to your food without adding sodium. o Try lemon juice, lime juice, fruit juice or vinegar. o Dry or fresh herbs add flavor. Try basil, bay leaf, dill, rosemary, parsley, ramesh, dry mustard, nutmeg, thyme, and paprika.  o Pepper, red pepper flakes, and cayenne pepper can add spice to your meals without adding sodium. Hot sauce contains sodium, but if you use just a drop or two, it will not add up to much.  o Buy a sodium-free seasoning blend or make your own at home. Additional Lifestyle Tips  Achieve and maintain a healthy weight.  Talk with your RDN or your doctor about what is a healthy weight for you.  Set goals to reach and maintain that weight.  To lose weight, reduce your calorie intake along with increasing your physical activity. A weight loss of 10 to 15 pounds could reduce LDL-cholesterol by 5 milligrams per deciliter. Participate in physical activity.  Talk with your health care team to find out what types of physical activity are best for you. Set a plan to get about 30 minutes of exercise on most days.     Foods Recommended  Food Group Foods Recommended   Grains Grain foods including whole grains: whole wheat, barley, rye, buckwheat, corn, teff, quinoa, millet, amaranth, brown or wild rice, sorghum, and oats  Processed whole grains such as pasta, rice, hot and cold cereals, and snacks that contain less than 300 mg sodium per serving  Whole grain bread, crackers, rolls, or andrea with <48 mg sodium per slice (Note: yeast breads usually have less sodium than those made with baking soda),  Home-made bread made with reduced-sodium baking soda Protein Foods Fresh red meat: lean, trimmed cuts of beef, pork, or lamb  Fresh poultry: skinless chicken or turkey  Fresh seafood: fish (particularly fatty fish: salmon, herring), shrimp, lobster, clams, and scallops  Eggs (2-4 per week), eggwhites or egg substitute  Nuts and seeds (unsalted): peanuts, almonds, pistachios, and sunflower seeds, unsalted; peanut butter, almond butter, and sunflower seed butter, reduced sodium. Soy foods: tofu, tempeh, or soynuts  Meat alternatives: veggie burgers and sausages from plant protein without added sodium  Legumes: such as dried beans, lentils, or peas at least a few times per week in place of other protein sources, unsalted   Dairy Skim, ½% or 1% milk, low-fat yogurt, low-sodium cheeses (Swiss cheese, ricotta cheese, and fresh mozzarella, low sodium cottage cheese)  Fortified soymilk, almond milk, rice milk, hemp milk  Frozen desserts (½ cup) made from low-fat milk   Vegetables Fresh, frozen, and canned (unsalted) whole vegetables, including dark-green, red and orange vegetables, legumes (beans and peas), and starchy vegetables without added sauces, salt, or sodium; low-sodium vegetable juices   Fruits Fresh, frozen, canned and dried whole unsweetened fruits canned fruit packed in water or fruit juice without added sugar; 100% fruit juice   Oils Unsaturated vegetable oils: [place], avocado, canola, cashew, corn, grapeseed, olive, safflower, sesame, soybean, sunflower  Margarines and spreads which list liquid vegetable oil as the first ingredient and does not contain trans fats (partially hydrogenated oil)  Salad dressings made from oil and low in sodium (salt)  Avocado   Other Prepared foods, including soups, casseroles, and salads made from recommended ingredients and contain <600 mg sodium. Homemade soups, casseroles, entrees, and side dishes typically contain less sodium that prepared alternatives.   Homemade soups and sauces such as gravy  Low-sodium crackers, chips, sauerkraut  Tomato or pasta sauce with high levels of salt (>300 mg per serving)  Amrita Rho vegetables   Fruits None   Oils Solid shortening or partially hydrogenated oils  Solid margarine made with hydrogenated or partially hydrogenated oils or trans fat  Salted margarine that contains trans fats  Butter (salted or unsalted)  Salad dressings with trans fat or high levels of sodium ([place], blue cheese, [place], [place])  Tropical oils (coconut, palm, palm kernel oils)   Other Sugary and/or fatty desserts, candy, and other sweets  Canned soups that are >600 mg of sodium  Frozen meals and prepared sides that are >600 mg of sodium  Store-bought egg beaters (with added sodium)  Salts:  sea salt, kosher salt, onion salt, and garlic salt, seasoning mixes containing salt  Flavorings: bouillon cubes, catsup or ketchup, barbeque sauce, Worcestershire sauce, soy sauce, salsa, relish, teriyaki sauce     Heart-Healthy Eating Sample 1-Day Menu    Breakfast 1 cup oatmeal  1 cup fat-free milk  1 cup blueberries  1 cup brewed coffee  1 ounce almonds   Lunch 2 slices whole-wheat bread  2 oz lean deli turkey breast  1 oz low-fat Swiss cheese  2 slices tomato  2 lettuce leaves  1 pear  1 cup skim milk   Afternoon Snack 1 oz trail mix (with nuts, seeds, raisins)   Evening Meal 3 oz broiled salmon  2/3 cup brown rice  1 tsp margarine  1/2 cup cooked broccoli  1/2 cup cooked carrots  1 cup tossed salad  1 teaspoon olive oil and vinegar dressing  1 small whole-wheat roll  1 tsp margarine  1 cup tea   Evening Snack 1 banana     Heart-Healthy - Reduced Sodium Vegan 1-Day Sample Menu    Breakfast 1 slice whole wheat toast  2 tablespoons peanut butter without salt  Tofu scramble made with: ½ cup calcium-set tofu  ½ cup green pepper  ½ cup spinach  ½ cup tomatoes  ½ cup white mushrooms  1 teaspoon canola oil  1 cup soymilk fortified with calcium, vitamin B12, and vitamin D   Lunch 1 cup reduced sodium split pea soup  1 whole wheat dinner roll  1 medium apple   [place] [place] [employer] made with: 1 cup lentils  ½ cup cooked broccoli  ½ cup cooked carrots  2 tablespoons hummus  1 cup sliced strawberries  1 cup soymilk fortified with calcium, vitamin B12, and vitamin D   Evening Snack 1 cup soy yogurt  ¼ cup mixed nuts       Please call the dietitian office at [phone] with any nutrition questions

## 2021-10-27 NOTE — CONSULTS
[facility] @ 1362  Re-Paged @ [id number]  RE: Concern for postpartum cardiomyopathy versus pre-eclampsia Per Maria Dolores Vasquez PA-C  OB Responded @ 1992

## 2021-10-27 NOTE — CONSULTS
[address] [facility]  [phone]      Attending Physician: Jamaal Jarquin MD  Reason for Consultation/Chief Complaint: SOB    Subjective   History of Present Illness:  Velasquez Menendez is a 27 y.o. patient who presented to the hospital with complaints of SOB, baby delivered 5 days ago. No pregnancy issues. Baby/delivery 3, no prior issues, Currently breast feeding. Went home ok then started to have increased SOB yesterday and some chest tightness. MEEK with only excertion. Pressure in chest no radiation, lasted few min, + leg edema since pregnant, no orthopnea, PND  no edema. No heart issues. No COVID contacts or vaccination      Past Medical History:   has a past medical history of Rh incompatibility and Shoulder dystocia, delivered. Surgical History:   has a past surgical history that includes Abdomen surgery. Social History:   reports that she has never smoked. She has never used smokeless tobacco. She reports that she does not drink alcohol and does not use drugs. Family History:  family history includes Asthma in her sister; Cancer in her maternal aunt, paternal aunt, and paternal grandmother; High Blood Pressure in her mother; High Cholesterol in her father; Stevan Viera / Shyam Bolivar in her maternal aunt; Stroke in her maternal aunt. Home Medications:  Were reviewed and are listed in nursing record and/or below  Prior to Admission medications    Medication Sig Start Date End Date Taking? Authorizing Provider   ferrous sulfate (IRON 325) 325 (65 Fe) MG tablet Take 325 mg by mouth daily (with breakfast)    Historical Provider, MD   Prenatal Vit-Fe Fumarate-FA (PRENATAL MULTIVITAMIN) 27-1 MG TABS tablet Take 1 tablet by mouth daily.       Historical Provider, MD        CURRENT Medications:  sodium chloride flush 0.9 % injection 10 mL, 2 times per day  sodium chloride flush 0.9 % injection 10 mL, PRN  0.9 % sodium chloride infusion, PRN  potassium chloride 10 mEq/100 mL IVPB (Peripheral Line), PRN  magnesium sulfate 2000 mg in 50 mL IVPB premix, PRN  promethazine (PHENERGAN) tablet 12.5 mg, Q6H PRN   Or  ondansetron (ZOFRAN) injection 4 mg, Q6H PRN  acetaminophen (TYLENOL) tablet 650 mg, Q6H PRN   Or  acetaminophen (TYLENOL) suppository 650 mg, Q6H PRN  labetalol (NORMODYNE;TRANDATE) injection 10 mg, Q4H PRN  prenatal vitamin (VOL-PLUS) 27-1 MG tablet 1 tablet, Daily  ferrous sulfate (IRON 325) tablet 325 mg, Daily with breakfast  magnesium sulfate (48860 mg/500mL infusion), Continuous  lactated ringers infusion, Continuous        Allergies:  Patient has no known allergies. Review of Systems:   A 14 point review of symptoms completed. Pertinent positives identified in the HPI, all other review of symptoms negative as below.       Objective   PHYSICAL EXAM:    Vitals:    10/27/21 1013   BP:    Pulse:    Resp:    Temp:    SpO2: 100%    Weight: 154 lb (69.9 kg)         General Appearance:  Alert, cooperative, no distress, appears stated age   Head:  Normocephalic, without obvious abnormality, atraumatic   Eyes:  PERRL, conjunctiva/corneas clear   Nose: Nares normal, no drainage or sinus tenderness   Throat: Lips, mucosa, and tongue normal   Neck: Supple, symmetrical, trachea midline, no adenopathy, thyroid: not enlarged, symmetric, no tenderness/mass/nodules, no carotid bruit or JVD   Lungs:   Clear to auscultation bilaterally, respirations unlabored   Chest Wall:  No deformity or tenderness   Heart:  Regular rate and rhythm, S1, S2 normal, trace flow murmur   Abdomen:   Soft, non-tender, bowel sounds active all four quadrants,  no masses, no organomegaly   Extremities: Extremities normal, atraumatic, no cyanosis, + ankle edema   Pulses: 2+ and symmetric   Skin: Skin color, texture, turgor normal, no rashes or lesions   Pysch: Normal mood and affect   Neurologic: Normal gross motor and sensory exam.         Labs   CBC:   Lab Results   Component Value Date    WBC 7.9 10/26/2021 RBC 3.53 10/26/2021    HGB 10.5 10/26/2021    HCT 31.7 10/26/2021    MCV 89.9 10/26/2021    RDW 15.3 10/26/2021     10/26/2021     CMP:  Lab Results   Component Value Date     10/26/2021    K 3.9 10/26/2021     10/26/2021    CO2 20 10/26/2021    BUN 16 10/26/2021    CREATININE 0.6 10/26/2021    GFRAA >60 10/26/2021    GFRAA >60 11/16/2012    AGRATIO 0.9 10/26/2021    LABGLOM >60 10/26/2021    GLUCOSE 92 10/26/2021    PROT 7.0 10/26/2021    CALCIUM 8.7 10/26/2021    BILITOT <0.2 10/26/2021    ALKPHOS 271 10/26/2021     10/26/2021     10/26/2021     PT/INR:  No results found for: PTINR  HgBA1c:No results found for: LABA1C  Lab Results   Component Value Date    TROPONINI <0.01 10/26/2021         Cardiac Data     Last EKG:   10/26/2021 Sinus janneth at 59, PRWP    Echo:   Summary   --Low normal left ventricular systolic function with ejection fraction of   55%. No regional wall motion abnormalites are seen. Left ventricular   diastolic filling pressure is normal.   Trace - mild mitral and tricuspid regurgitation. Stress Test:    Cath:    Studies:   CXR:  1. Congestive heart failure. 2. Mosaic attenuation in the lungs.  Atelectasis, distal airway inflammation,   and early pulmonary edema are in the differential.   3. No acute pulmonary embolism to the segmental arteries. 4. Other findings as described. Assessment and Plan      1. SOB  2. S/p vaginal delivery 10/22/2021  3. COVID - 19: + this admission  4. Elevated LFTS        PLAN  1. Patient currently does show any signs of active heart failure and normal LVEF and pressures is reassuring and against a postpartum cardiopathy. 2.  It is possible that the significant hypertension on admission coupled with fluid shifts from pregnancy and delivery led to a flash pulmonary edema episode and hence abnormal CTPA   -We will defer blood pressure management to OB  3. Chest pain could have been from blood pressure.   The fact that 2 troponins and her echo is normal would favor against any spontaneous coronary dissection. 4.  Of course and although this patient was also diagnosed with COVID-19 that as an extra wrench to the clinical picture.   -We will check sed rate and CRP, but will need to trend given recent delivery   -Normal echo is reassuring  5. We will see her as an outpatient with low threshold for cardiac MRI to evaluate any myocardial involvement given new COVID infection          Patient Active Problem List   Diagnosis    Abdominal pain complicating pregnancy, antepartum    Active labor    Vaginal delivery    Shoulder dystocia, delivered, current hospitalization    Preeclampsia in postpartum period           Thank you for allowing us to participate in the care of Danny Clancy. Please call me with any questions [phone]. Brenden Lopez MD, [address] [facility] Cardiologist  [place] [address]  [phone] [facility]  [phone] [address] [place] [address]  10/27/2021 10:23 AM    I will address the patient's cardiac risk factors and adjusted pharmacologic treatment as needed. In addition, I have reinforced the need for patient directed risk factor modification. All questions and concerns were addressed to the patient/family. Alternatives to my treatment were discussed. The note was completed using EMR. Every effort was made to ensure accuracy; however, inadvertent computerized transcription errors may be present.

## 2021-10-27 NOTE — CONSULTS
Department of Obstetrics  Consult Note          CHIEF COMPLAINT:   SOB PPD #5    History obtained from patient    HISTORY OF PRESENT ILLNESS:     The patient is a 27 y.o. female with significant past medical history of  5 days agto who presents with increasing SOB over the past day with cough and dyspnea on exertion. Pt evaluated in ER and found to have CT showing evidence of heart failure.   Pt also found to have elevated bp in ER and elevated AST/ALT    Past Medical History:        Diagnosis Date    Rh incompatibility     Shoulder dystocia, delivered     G4     Past Surgical History:        Procedure Laterality Date    ABDOMEN SURGERY      D&C for SAB        P    meds:  Current Facility-Administered Medications:     sodium chloride flush 0.9 % injection 10 mL, 10 mL, IntraVENous, 2 times per day, Neil Virkzi, DO    sodium chloride flush 0.9 % injection 10 mL, 10 mL, IntraVENous, PRN, Neil Bella, DO    0.9 % sodium chloride infusion, 25 mL, IntraVENous, PRN, Neil Bella, DO    potassium chloride 10 mEq/100 mL IVPB (Peripheral Line), 10 mEq, IntraVENous, PRN, Marjan Bella, DO    magnesium sulfate 2000 mg in 50 mL IVPB premix, 2,000 mg, IntraVENous, PRN, Marjan Bella, DO    promethazine (PHENERGAN) tablet 12.5 mg, 12.5 mg, Oral, Q6H PRN **OR** ondansetron (ZOFRAN) injection 4 mg, 4 mg, IntraVENous, Q6H PRN, Marjan Bella, DO    acetaminophen (TYLENOL) tablet 650 mg, 650 mg, Oral, Q6H PRN **OR** acetaminophen (TYLENOL) suppository 650 mg, 650 mg, Rectal, Q6H PRN, Majran Bella, DO    labetalol (NORMODYNE;TRANDATE) injection 10 mg, 10 mg, IntraVENous, Q4H PRN, Neil Bella, DO    prenatal vitamin (VOL-PLUS) 27-1 MG tablet 1 tablet, 1 tablet, Oral, Daily, Neil Bella, DO    ferrous sulfate (IRON 325) tablet 325 mg, 325 mg, Oral, Daily with breakfast, Neil Bella, DO    magnesium sulfate (01875 mg/500mL infusion), 2,000 mg/hr, IntraVENous, Continuous, Rometta Backer MD Isabella, Last Rate: 50 mL/hr at 10/27/21 0710, 2,000 mg/hr at 10/27/21 0710    lactated ringers infusion, , IntraVENous, Continuous, Lanny Greene MD, Last Rate: 75 mL/hr at 10/27/21 0710, Rate Verify at 10/27/21 0710       Allergies:  Patient has no known allergies. Social History:  TOBACCO:   reports that she has never smoked. She has never used smokeless tobacco.  ETOH:   reports no history of alcohol use. DRUGS:   reports no history of drug use. Family History:       Problem Relation Age of Onset    High Blood Pressure Mother     High Cholesterol Father     Asthma Sister     Cancer Maternal Aunt     Miscarriages / Stillbirths Maternal Aunt     Stroke Maternal Aunt     Cancer Paternal Aunt     Cancer Paternal Grandmother         PHYSICAL EXAM:    Vitals:  BP (!) 150/80   Pulse 52   Temp 98.3 °F (36.8 °C) (Oral)   Resp 16   Ht 5' (1.524 m)   Wt 154 lb (69.9 kg)   SpO2 97%   Breastfeeding No   BMI 30.08 kg/m²     CONSTITUTIONAL:  awake, alert, cooperative, no apparent distress, and appears stated age  ENT:  Normocephalic, without obvious abnormality, atraumatic, sinuses nontender on palpation, external ears without lesions, oral pharynx with moist mucus membranes, tonsils without erythema or exudates, gums normal and good dentition.   NECK:  Supple, symmetrical, trachea midline, no adenopathy, thyroid symmetric, not enlarged and no tenderness, skin normal  BACK:  Symmetric, no curvature, spinous processes are non-tender on palpation, paraspinous muscles are non-tender on palpation, no costal vertebral tenderness  LUNGS:  No increased work of breathing, good air exchange, clear to auscultation bilaterally, no crackles or wheezing  CARDIOVASCULAR:  Normal apical impulse, regular rate and rhythm, normal S1 and S2, no S3 or S4, and no murmur noted  ABDOMEN:  No scars, normal bowel sounds, soft, non-distended, non-tender, no masses palpated, no hepatosplenomegally  MUSCULOSKELETAL:  there is no redness, warmth, or swelling of the joints  NEUROLOGIC:  Awake, alert, oriented to name, place and time. Cranial nerves II-XII are grossly intact. Motor is 5 out of 5 bilaterally. Cerebellar finger to nose, heel to shin intact. Sensory is intact. Babinski down going, Romberg negative, and gait is normal.  SKIN:  normal skin color, texture, turgor    DATA:  Recent Labs     10/26/21  2115   WBC 7.9   HGB 10.5*   HCT 31.7*        Recent Labs     10/26/21  2115   *   K 3.9      CO2 20*   BUN 16   CREATININE 0.6   CALCIUM 8.7   *   *       Labs:    CBC:   Lab Results   Component Value Date    WBC 7.9 10/26/2021    RBC 3.53 10/26/2021    HGB 10.5 10/26/2021    HCT 31.7 10/26/2021    MCV 89.9 10/26/2021    RDW 15.3 10/26/2021     10/26/2021     CMP:    Lab Results   Component Value Date     10/26/2021    K 3.9 10/26/2021     10/26/2021    CO2 20 10/26/2021    BUN 16 10/26/2021    PROT 7.0 10/26/2021   PCR . 8    IMPRESSION/RECOMMENDATIONS:      A: PP day #5 with preeclampsia with severe features including elevated LFT's      Possible post partum cardiomyopathy    P: appreciate medicine help      Consult cardiology with echo to evaluate cardiac function      Continue mag sulfate 2gm/hour for 24 hours.

## 2021-10-27 NOTE — PROGRESS NOTES
[facility]  Labor and Delivery   Post Partum Progress Note      SUBJECTIVE:  PPD 5 s/p . Denies CP/SOB. States \"feels well\"    OBJECTIVE:      Vitals:  Vitals:    10/27/21 1628   BP:    Pulse:    Resp:    Temp:    SpO2: 100%      Lungs: clear. Cor: RRR  Fundus firm, normal lochia  Extremities normal        DATA:    CBC:    Lab Results   Component Value Date    WBC 7.9 10/26/2021    RBC 3.53 10/26/2021    HGB 10.5 10/26/2021    HCT 31.7 10/26/2021    MCV 89.9 10/26/2021    RDW 15.3 10/26/2021     10/26/2021     CMP:    Lab Results   Component Value Date     10/26/2021    K 3.9 10/26/2021     10/26/2021    CO2 20 10/26/2021    BUN 16 10/26/2021    PROT 7.0 10/26/2021     COVID pos    ASSESSMENT & PLAN:      Pre-eclampsia with severe features (elev LFT's). MgSO4 @ 2 gm/hr. No anti-htn required. Repeat CMP/CBC in AM. D/c MgSO4 in AM.     CP/CHF-elevated BNP, CT suggestive of CHF. Cardiac echo wnl. Appreciate Cardiologist consult and f/u    COVID positive. Minimal sx, pulse ox 99%-RA. ? Relationship with pre-eclamp and/or cardiac changes. Supportive care.     Juan Pablo Fonseca

## 2021-10-27 NOTE — PROGRESS NOTES
Zaid Youssef in Echo contacted regarding patient order for echo. Echo has not been completed, but is on the list of procedures today.

## 2021-10-27 NOTE — CONSULTS
Nutrition Education  Pt seen per [facility] for heart healthy diet education. Awaiting Echo. Provided heart healthy education over the phone due to droplet plus precautions. Handouts left in AVS. Provided pt with written instruction on heart healthy nutrition therapy. Pt voiced understanding. Time spent: 5 minutes    · Verbally reviewed information with Patient  · Educated on Heart Healthy Diet  · Written educational materials provided. · Contact name and number provided. · Refer to Patient Education activity for more details.     Electronically signed by Adalberto Galindo MS, RD, LD on 10/27/21 at 2:49 PM EDT    Contact: Office: [phone]; [address]: [phone]

## 2021-10-27 NOTE — PROGRESS NOTES
Dietician Liane contacted regarding consult requested by Hospitalist. Chica Feng will see patient today

## 2021-10-27 NOTE — PLAN OF CARE
Problem:  Activity:  Goal: Ability to tolerate increased activity will improve  Description: Ability to tolerate increased activity will improve  10/27/2021 1944 by Zoran Caruso RN  Outcome: Ongoing  10/27/2021 0642 by Grant Florence RN  Outcome: Ongoing     Problem: Cardiac:  Goal: Hemodynamic stability will improve  Description: Hemodynamic stability will improve  10/27/2021 1944 by Zoran Caruso RN  Outcome: Ongoing  10/27/2021 0642 by Grant Florence RN  Outcome: Ongoing  Goal: Complications related to the disease process, condition or treatment will be avoided or minimized  Description: Complications related to the disease process, condition or treatment will be avoided or minimized  10/27/2021 1944 by Zoran Caruso RN  Outcome: Ongoing  10/27/2021 0642 by Grant Florence RN  Outcome: Ongoing  Goal: Cerebral tissue perfusion will improve  Description: Cerebral tissue perfusion will improve  10/27/2021 1944 by Zoran Caruso RN  Outcome: Ongoing  10/27/2021 0642 by Grant Florence RN  Outcome: Ongoing     Problem: Coping:  Goal: Ability to identify and develop effective coping behavior will improve  Description: Ability to identify and develop effective coping behavior will improve  10/27/2021 1944 by Zoran Caruso RN  Outcome: Ongoing  10/27/2021 0642 by Grant Florence RN  Outcome: Ongoing  Goal: Coping behaviors will improve  Description: Coping behaviors will improve  10/27/2021 1944 by Zoran Caruso RN  Outcome: Ongoing  10/27/2021 0642 by Grant Florence RN  Outcome: Ongoing  Goal: Ability to verbalize feelings will improve  Description: Ability to verbalize feelings will improve  10/27/2021 1944 by Zoran Caruso RN  Outcome: Ongoing  10/27/2021 0642 by Grant Florence RN  Outcome: Ongoing     Problem: Health Behavior:  Goal: Identification of resources available to assist in meeting health care needs will improve  Description: Identification of resources available to assist in meeting health care needs will improve  10/27/2021 1944 by Lennox Gonsalves RN  Outcome: Ongoing  10/27/2021 0003 by Williams Bolden RN  Outcome: Ongoing     Problem: Nutritional:  Goal: Ability to identify appropriate dietary choices will improve  Description: Ability to identify appropriate dietary choices will improve  10/27/2021 1944 by Lennox Gonsalves RN  Outcome: Ongoing  10/27/2021 0642 by Williams Bolden RN  Outcome: Ongoing     Problem: Fluid Volume:  Goal: Will maintain adequate fluid volume  Description: Will maintain adequate fluid volume  10/27/2021 1944 by Lennox Gonsalves RN  Outcome: Ongoing  10/27/2021 0642 by Williams Bolden RN  Outcome: Ongoing  Goal: Ability to achieve and maintain adequate urine output will improve  Description: Ability to achieve and maintain adequate urine output will improve  10/27/2021 1944 by Lennox Gonsalves RN  Outcome: Ongoing  10/27/2021 0642 by Williams Bolden RN  Outcome: Ongoing  Goal: Peripheral tissue perfusion will improve  Description: Peripheral tissue perfusion will improve  10/27/2021 1944 by Lennox Gonsalves RN  Outcome: Ongoing  10/27/2021 0642 by Williams Bolden RN  Outcome: Ongoing     Problem: Life Cycle:  Goal: Chance of risk for complications during labor will decrease  Description: Chance of risk for complications during labor will decrease  10/27/2021 1944 by Lennox Gonsalves RN  Outcome: Ongoing  10/27/2021 0642 by Williams Bolden RN  Outcome: Ongoing  Goal: Will show no signs and symptoms of excessive bleeding  Description: Will show no signs and symptoms of excessive bleeding  10/27/2021 1944 by Lennox Gonsalves RN  Outcome: Ongoing  10/27/2021 0642 by Williams Bolden RN  Outcome: Ongoing     Problem: Physical Regulation:  Goal: Risk for medication side effects will decrease  Description: Risk for medication side effects will decrease  10/27/2021 1944 by Holly Fontenot Evens Vasquez RN  Outcome: Ongoing  10/27/2021 3287 by Shimon Velasco RN  Outcome: Ongoing  Goal: Will remain free of preeclampsia complications  Description: Will remain free of preeclampsia complications  95/73/2912 1944 by Maisha Gomez RN  Outcome: Ongoing  10/27/2021 4047 by Shimon Velasco RN  Outcome: Ongoing  Goal: Signs of adequate cerebral perfusion will increase  Description: Signs of adequate cerebral perfusion will increase  10/27/2021 1944 by Maisha Gomez RN  Outcome: Ongoing  10/27/2021 0642 by Shimon Velasco RN  Outcome: Ongoing     Problem: Sensory:  Goal: Ability to compensate for vision loss will improve  Description: Ability to compensate for vision loss will improve  10/27/2021 1944 by Maisha Gomez RN  Outcome: Ongoing  10/27/2021 0642 by Shimon Velasco RN  Outcome: Ongoing     Problem: Airway Clearance - Ineffective  Goal: Achieve or maintain patent airway  Outcome: Ongoing     Problem: Gas Exchange - Impaired  Goal: Absence of hypoxia  Outcome: Ongoing  Goal: Promote optimal lung function  Outcome: Ongoing     Problem: Breathing Pattern - Ineffective  Goal: Ability to achieve and maintain a regular respiratory rate  Outcome: Ongoing     Problem:  Body Temperature -  Risk of, Imbalanced  Goal: Ability to maintain a body temperature within defined limits  Outcome: Ongoing  Goal: Will regain or maintain usual level of consciousness  Outcome: Ongoing  Goal: Complications related to the disease process, condition or treatment will be avoided or minimized  Outcome: Ongoing     Problem: Isolation Precautions - Risk of Spread of Infection  Goal: Prevent transmission of infection  Outcome: Ongoing     Problem: Nutrition Deficits  Goal: Optimize nutritional status  Outcome: Ongoing     Problem: Risk for Fluid Volume Deficit  Goal: Maintain normal heart rhythm  Outcome: Ongoing  Goal: Maintain absence of muscle cramping  Outcome: Ongoing  Goal: Maintain normal serum potassium, sodium, calcium, phosphorus, and pH  Outcome: Ongoing     Problem: Loneliness or Risk for Loneliness  Goal: Demonstrate positive use of time alone when socialization is not possible  Outcome: Ongoing     Problem: Fatigue  Goal: Verbalize increase energy and improved vitality  Outcome: Ongoing     Problem: Patient Education: Go to Patient Education Activity  Goal: Patient/Family Education  Outcome: Ongoing

## 2021-10-27 NOTE — H&P
Hospital Medicine History & Physical      PCP: No primary care provider on file. Date of Admission: 10/26/2021    Date of Service: Pt seen/examined on 10/27/2021  Pt seen/examined face to face on and admitted as inpatient with expected LOS greater than two midnights due to medical therapy    Chief Complaint:    Chief Complaint   Patient presents with    Shortness of Breath     Had vaginal birth on 10/22/2021        History Of Present Illness:      27 y.o. female healthy, functional independent  who presented to [facility] with chief complaint of shortness of breath. Patient reported that she had uncomplicated deliveries. Patient reported that her most recent delivery was vaginal and did not require epidural.  Patient reported that she been having progressive shortness of breath worsening edema exacerbated on exertion alleviated. Patient denied prior history of eclampsia, family history eclampsia or cardiomyopathy. Patient reported mother had hypertension. Patient denies smoking drinking or drugs. Patient denied been stressed with delivery however no other concern. Patient also reported some lower extremity swelling progressively worsening with exertion alleviated with rest      Past Medical History:          Diagnosis Date    Rh incompatibility     Shoulder dystocia, delivered     G4       Past Surgical History:          Procedure Laterality Date    ABDOMEN SURGERY      D&C for SAB        Medications Prior to Admission:      Prior to Admission medications    Medication Sig Start Date End Date Taking? Authorizing Provider   ferrous sulfate (IRON 325) 325 (65 Fe) MG tablet Take 325 mg by mouth daily (with breakfast)    Historical Provider, MD   Prenatal Vit-Fe Fumarate-FA (PRENATAL MULTIVITAMIN) 27-1 MG TABS tablet Take 1 tablet by mouth daily. Historical Provider, MD       Allergies:  Patient has no known allergies.     Social History:          TOBACCO:   reports that she has never smoked. She has never used smokeless tobacco.  ETOH:   reports no history of alcohol use. E-Cigarettes/Vaping Use     Questions Responses    E-Cigarette/Vaping Use     Start Date     Passive Exposure     Quit Date     Counseling Given     Comments             Family History:      Reviewed in detail         Problem Relation Age of Onset    High Blood Pressure Mother     High Cholesterol Father     Asthma Sister     Cancer Maternal Aunt     Miscarriages / Stillbirths Maternal Aunt     Stroke Maternal Aunt     Cancer Paternal Aunt     Cancer Paternal Grandmother        REVIEW OF SYSTEMS:     Constitutional:  No Fever, No Chills, No Night Sweats  ENT/Mouth:  No Nasal Congestion,  No Hoarseness, No new mouth lesion  Eyes:  No Eye Pain, No Redness, No Discharge  Cardiovascular:  No Chest Pain, No Orthopnea, No Palpitations  Respiratory:  No Cough, No Sputum, + dyspnea  Gastrointestinal: No Vomiting, No Diarrhea, No abdominal pain  Genitourinary: No Urinary Frequency, No Hematuria, No Urinary pain  Musculoskeletal:  No worsening Arthralgias, No worsening Myalgias  Skin:  No new Skin Lesions, No new skin rash  Neuro:  No new weakness, No new numbness. Psych:  No suicial ideation, No Violence ideation    PHYSICAL EXAM PERFORMED:    BP (!) 166/86   Pulse 50   Temp 99.6 °F (37.6 °C) (Oral)   Resp 17   Ht 5' (1.524 m)   Wt 154 lb (69.9 kg)   SpO2 98%   Breastfeeding No   BMI 30.08 kg/m²     General appearance:  mild acute distress, appears older than stated age  HEENT:   atraumatic, sclera anicteric, Conjunctivae clear. Neck: Supple,Trachea midline, no goiter  Respiratory:minimal accessory muscle usage, Normal respiratory effort. Clear to auscultation, bilaterally without wheezing  Cardiovascular:  Regular rate and rhythm, capillary refill 2 seconds  Abdomen: Soft, non-tender, non-distended with normal bowel sounds. Musculoskeletal:  No clubbing, cyanosis. trace edema LE bilaterally.    Skin: turgor normal.  No new rashes or lesions. Neurologic: Alert and oriented x4, no new focal sensory/motor deficits. Labs:     Recent Labs     10/26/21  2115   WBC 7.9   HGB 10.5*   HCT 31.7*        Recent Labs     10/26/21  2115   *   K 3.9      CO2 20*   BUN 16   CREATININE 0.6   CALCIUM 8.7     Recent Labs     10/26/21  2115   *   *   BILITOT <0.2   ALKPHOS 271*     No results for input(s): INR in the last 72 hours. Recent Labs     10/26/21  2115   TROPONINI <0.01       Urinalysis:      Lab Results   Component Value Date    NITRU Negative 10/27/2021    WBCUA 0-2 10/27/2021    BACTERIA Rare 10/27/2021    RBCUA 5-10 10/27/2021    BLOODU MODERATE 10/27/2021    SPECGRAV <=1.005 10/27/2021    GLUCOSEU Negative 10/27/2021    GLUCOSEU NEGATIVE 01/09/2011       Radiology:     :     EKG:  I have reviewed the EKG with the following interpretation:   pending    CT CHEST PULMONARY EMBOLISM W CONTRAST   Final Result   1. Congestive heart failure. 2. Mosaic attenuation in the lungs. Atelectasis, distal airway inflammation,   and early pulmonary edema are in the differential.   3. No acute pulmonary embolism to the segmental arteries. 4. Other findings as described. ASSESSMENT AND PLAN:    Active Hospital Problems    Diagnosis Date Noted    Preeclampsia in postpartum period [O14.95] 10/27/2021       Postpartum preeclampsia:  Patient presented with hypertension, transaminitis, proteinuria postpartum  Labetalol as needed for BP medication, magnesium 1 g IV infusion, seizure precaution  I spoke with Dr. Tony Wilkins, much appreciated  Plan is to admit to medicine with OB/GYN consultation given there is concern for cardiac pathology. Suspected postpartum cardiomyopathy:  Sx currently could be explained by Pre-eclampsia. Echocardiogram pending  Depending on finding cardiology consultation.     Normocytic anemia,  LDH, haptoglobin for suspected hemolysis    Transaminitis: Etiology Pre-eclampsia;  Continue to monitor, and trend    Proteinuria: non-nephrotic range.    Etiology Pre-eclampsia;  Continue to monitor, and trend    Diet: Cardiac diet    DVT Prophylaxis: held    Dispo:   Expected LOS greater than two DO [facility]ilex

## 2021-10-27 NOTE — PLAN OF CARE
EMR and notes reviewed. Pt was seen and admitted overnight by colleague. Nothing to add at this time. WIll follow.    Cardiology Consulted and ECHo pending

## 2021-10-27 NOTE — PROGRESS NOTES
Dr. Eulalia Martines updated on patient covid positive test result.  Verbal order received to initiate droplet plus isolation

## 2021-10-27 NOTE — ED PROVIDER NOTES
[facility] Emergency Department    CHIEF COMPLAINT  Shortness of Breath (Had vaginal birth on 10/22/2021)      SHARED SERVICE VISIT  Evaluated by JOHN. My supervising physician was available for consultation. HISTORY OF PRESENT ILLNESS  Shannan Gill is a 27 y.o. female s/p postpartum vaginal birth x 4 days ago; presents emergency department for evaluation of shortness of breath. Patient states that today she developed that she has been having more short of breath as well as having some tightness in her chest.  Patient reports that she has tightness in pain with inspiration. Denies any recent fevers or chills. Denies any history of coagulopathy. No prior cardiac or respiratory disease. No complications with her prior pregnancies. No hemoptysis or leg swelling. No other complaints, modifying factors or associated symptoms. Nursing notes reviewed.    Past Medical History:   Diagnosis Date    Rh incompatibility     Shoulder dystocia, delivered     G4     Past Surgical History:   Procedure Laterality Date    ABDOMEN SURGERY      D&C for SAB 2007     Family History   Problem Relation Age of Onset    High Blood Pressure Mother     High Cholesterol Father     Asthma Sister     Cancer Maternal Aunt     Miscarriages / Stillbirths Maternal Aunt     Stroke Maternal Aunt     Cancer Paternal Aunt     Cancer Paternal Grandmother      Social History     Socioeconomic History    Marital status:      Spouse name: Not on file    Number of children: Not on file    Years of education: Not on file    Highest education level: Not on file   Occupational History    Not on file   Tobacco Use    Smoking status: Never Smoker    Smokeless tobacco: Never Used   Substance and Sexual Activity    Alcohol use: No    Drug use: No    Sexual activity: Yes     Partners: Male   Other Topics Concern    Not on file   Social History Narrative    Not on file     Social Determinants of Health     Financial Resource Strain:     Difficulty of Paying Living Expenses:    Food Insecurity:     Worried About [employer] [employer] in the Last Year:     [address] in the Last Year:    Transportation Needs:     Lack of Transportation (Medical):  Lack of Transportation (Non-Medical):    Physical Activity:     Days of Exercise per Week:     Minutes of Exercise per Session:    Stress:     Feeling of Stress :    Social Connections:     Frequency of Communication with Friends and Family:     Frequency of Social Gatherings with Friends and Family:     Attends Christianity Services:     Active Member of Clubs or Organizations:     Attends Club or Organization Meetings:     Marital Status:    Intimate Partner Violence:     Fear of Current or Ex-Partner:     Emotionally Abused:     Physically Abused:     Sexually Abused:      Current Facility-Administered Medications   Medication Dose Route Frequency Provider Last Rate Last Admin    furosemide (LASIX) injection 20 mg  20 mg IntraVENous Once Surinder Xiong PA-C         Current Outpatient Medications   Medication Sig Dispense Refill    ferrous sulfate (IRON 325) 325 (65 Fe) MG tablet Take 325 mg by mouth daily (with breakfast)      Prenatal Vit-Fe Fumarate-FA (PRENATAL MULTIVITAMIN) 27-1 MG TABS tablet Take 1 tablet by mouth daily. No Known Allergies    REVIEW OF SYSTEMS  10 systems reviewed, pertinent positives per HPI otherwise noted to be negative    PHYSICAL EXAM  BP (!) 143/91   Pulse 58   Temp 99.6 °F (37.6 °C) (Oral)   Resp 20   Ht 5' (1.524 m)   Wt 154 lb (69.9 kg)   SpO2 99%   Breastfeeding No   BMI 30.08 kg/m²   GENERAL APPEARANCE: Awake and alert. Cooperative. Resting comfortably. HEAD: Normocephalic. Atraumatic. EYES: PERRL. EOM's grossly intact. ENT: Mucous membranes are moist.   NECK: Supple. HEART: RRR. No murmurs. LUNGS: Respirations unlabored. CTAB. Good air exchange.  Speaking comfortably in full sentences. No wheeze, no rales. ABDOMEN: Soft. Non-distended. Non-tender. No guarding or rebound. No masses. No organomegaly. EXTREMITIES: No peripheral edema. Moves all extremities equally. All extremities neurovascularly intact. SKIN: Warm and dry. No acute rashes. NEUROLOGICAL: Alert and oriented. CN's 2-12 intact. No gross facial drooping. Strength 5/5, sensation intact. PSYCHIATRIC: Normal mood and affect. RADIOLOGY  CT CHEST PULMONARY EMBOLISM W CONTRAST   Final Result   1. Congestive heart failure. 2. Mosaic attenuation in the lungs. Atelectasis, distal airway inflammation,   and early pulmonary edema are in the differential.   3. No acute pulmonary embolism to the segmental arteries. 4. Other findings as described.                LABS  Labs Reviewed   CBC WITH AUTO DIFFERENTIAL - Abnormal; Notable for the following components:       Result Value    RBC 3.53 (*)     Hemoglobin 10.5 (*)     Hematocrit 31.7 (*)     Lymphocytes Absolute 0.9 (*)     All other components within normal limits    Narrative:     Performed at:  [address],  [address] [place] [address], [address]   Phone [phone]   COMPREHENSIVE METABOLIC PANEL W/ REFLEX TO MG FOR LOW K - Abnormal; Notable for the following components:    Sodium 135 (*)     CO2 20 (*)     Albumin 3.3 (*)     Albumin/Globulin Ratio 0.9 (*)     Alkaline Phosphatase 271 (*)      (*)      (*)     All other components within normal limits    Narrative:     Performed at:  [address],  [address] [place] [address], [address]   Phone [phone] - Abnormal; Notable for the following components:    Pro- (*)     All other components within normal limits    Narrative:     Performed at:  [address],  [address] [place] [address], [facility]   Phone [phone]   URINALYSIS - Abnormal; Notable for the following components:    Blood, Urine MODERATE (*)     Leukocyte Esterase, Urine TRACE (*)     All other components within normal limits    Narrative:     Performed at:  [facility]) [place] [address] [address]   Phone [phone]   TROPONIN    Narrative:     Performed at:  [facility]) [place] [address] [address]   Phone [phone]   MAGNESIUM    Narrative:     Performed at:  [facility]) - [place] [address] [address]   Phone [phone]   MICROSCOPIC URINALYSIS   PROTEIN / CREATININE RATIO, URINE       PROCEDURES  Unless otherwise noted below, none  Procedures    ED COURSE         CRITICAL CARE TIME  The total critical care time spent while evaluating and treating this patient was 30 minutes. This excludes time spent doing separately billable procedures. This includes time at the bedside, data interpretation, medication management, obtaining critical history from collateral sources if the patient is unable to provide it directly, and physician consultation. Specifics of interventions taken and potentially life-threatening diagnostic considerations are listed above in the medical decision making. MDM  MDM  69-year-old female status post postpartum x4 days presents emergency department for evaluation of shortness of breath develop today. On arrival to ED patient's vitals are within normal limits she is afebrile, nontachycardic with a saturation of 99% on room air. Blood pressure 143/91. Physical exam is rather benign with good air movement with no any wheeze or rhonchi. Given patient's recent operation as well as recent pregnancy will obtain CTA of the chest to evaluate for pulmonary embolism versus cardiomyopathy. BMP was obtained to evaluate for cardiomyopathy.     Patient's lab work was concerning for cardiomyopathy versus congestive heart failure given the elevated proBNP at 844. Troponin within normal limits. LFTs were elevated with alk phos 271  . Kidney function intact. CT demonstrated congestive heart failure as well as possible early pulmonary edema. Given patient's concerning for postpartum cardiomyopathy; we will reach out to hospital service to request admission and further cardiac evaluation. In regards to preeclampsia urinalysis was obtained which was negative for any protein within the urine. Spoke with patient's OB/GYN provider who recommended a protein to creatinine ratio spoke with lab and they were able to put this order in.  OB/GYN for me that they will reach out to the hospitalist.  Patient remained stable through her course in the emergency department. Patient was given 20 mg Lasix. DISPOSITION  admission to the hospital.    CLINICAL IMPRESSION  1.  Postpartum cardiomyopathy           Angela Hwang PA-C  10/27/21 4975

## 2021-10-27 NOTE — PLAN OF CARE
Problem:  Activity:  Goal: Ability to tolerate increased activity will improve  Description: Ability to tolerate increased activity will improve  Outcome: Ongoing     Problem: Cardiac:  Goal: Hemodynamic stability will improve  Description: Hemodynamic stability will improve  Outcome: Ongoing  Goal: Complications related to the disease process, condition or treatment will be avoided or minimized  Description: Complications related to the disease process, condition or treatment will be avoided or minimized  Outcome: Ongoing  Goal: Cerebral tissue perfusion will improve  Description: Cerebral tissue perfusion will improve  Outcome: Ongoing     Problem: Coping:  Goal: Ability to identify and develop effective coping behavior will improve  Description: Ability to identify and develop effective coping behavior will improve  Outcome: Ongoing  Goal: Coping behaviors will improve  Description: Coping behaviors will improve  Outcome: Ongoing  Goal: Ability to verbalize feelings will improve  Description: Ability to verbalize feelings will improve  Outcome: Ongoing     Problem: Health Behavior:  Goal: Identification of resources available to assist in meeting health care needs will improve  Description: Identification of resources available to assist in meeting health care needs will improve  Outcome: Ongoing     Problem: Nutritional:  Goal: Ability to identify appropriate dietary choices will improve  Description: Ability to identify appropriate dietary choices will improve  Outcome: Ongoing     Problem: Fluid Volume:  Goal: Will maintain adequate fluid volume  Description: Will maintain adequate fluid volume  Outcome: Ongoing  Goal: Ability to achieve and maintain adequate urine output will improve  Description: Ability to achieve and maintain adequate urine output will improve  Outcome: Ongoing  Goal: Peripheral tissue perfusion will improve  Description: Peripheral tissue perfusion will improve  Outcome: Ongoing Problem: Life Cycle:  Goal: Chance of risk for complications during labor will decrease  Description: Chance of risk for complications during labor will decrease  Outcome: Ongoing  Goal: Will show no signs and symptoms of excessive bleeding  Description: Will show no signs and symptoms of excessive bleeding  Outcome: Ongoing     Problem: Physical Regulation:  Goal: Risk for medication side effects will decrease  Description: Risk for medication side effects will decrease  Outcome: Ongoing  Goal: Will remain free of preeclampsia complications  Description: Will remain free of preeclampsia complications  Outcome: Ongoing  Goal: Signs of adequate cerebral perfusion will increase  Description: Signs of adequate cerebral perfusion will increase  Outcome: Ongoing     Problem: Sensory:  Goal: Ability to compensate for vision loss will improve  Description: Ability to compensate for vision loss will improve  Outcome: Ongoing

## 2021-10-27 NOTE — ED PROVIDER NOTES
I independently performed a history and physical on Es Laureano. All diagnostic, treatment, and disposition decisions were made by myself in conjunction with the advanced practice provider. EKG: Normal sinus rhythm, rate 60, normal axis, QTC within normal limits, overall low voltage. No acute ST or T wave abnormalities. Patient with chest tightness and shortness of breath with associated pedal edema. She has several days postpartum. Work-up concerning due to enlarged heart and evidence of CHF worrisome for postpartum cardiomyopathy. Patient also with some hypertension, elevation of LFTs, elevated protein to creatinine ratio which could represent preeclampsia. Patient will be admitted to labor and delivery with both the hospitalist, OB, and cardiology on board. Plan for magnesium and echo in the morning. For further details of Yesi Kerr emergency department encounter, please see Roe LLAMAS's documentation.              Kendal Armendariz MD  10/27/21 7290

## 2021-10-28 VITALS
HEART RATE: 67 BPM | HEIGHT: 60 IN | WEIGHT: 137.5 LBS | BODY MASS INDEX: 27 KG/M2 | SYSTOLIC BLOOD PRESSURE: 141 MMHG | RESPIRATION RATE: 18 BRPM | OXYGEN SATURATION: 99 % | DIASTOLIC BLOOD PRESSURE: 91 MMHG | TEMPERATURE: 98.1 F

## 2021-10-28 LAB
A/G RATIO: 0.9 (ref 1.1–2.2)
ALBUMIN SERPL-MCNC: 3.4 G/DL (ref 3.4–5)
ALP BLD-CCNC: 256 U/L (ref 40–129)
ALT SERPL-CCNC: 134 U/L (ref 10–40)
ANION GAP SERPL CALCULATED.3IONS-SCNC: 15 MMOL/L (ref 3–16)
AST SERPL-CCNC: 177 U/L (ref 15–37)
BASOPHILS ABSOLUTE: 0 K/UL (ref 0–0.2)
BASOPHILS RELATIVE PERCENT: 0.5 %
BILIRUB SERPL-MCNC: 0.3 MG/DL (ref 0–1)
BUN BLDV-MCNC: 11 MG/DL (ref 7–20)
CALCIUM IONIZED: 0.88 MMOL/L (ref 1.12–1.32)
CALCIUM SERPL-MCNC: 7 MG/DL (ref 8.3–10.6)
CHLORIDE BLD-SCNC: 97 MMOL/L (ref 99–110)
CO2: 25 MMOL/L (ref 21–32)
CREAT SERPL-MCNC: <0.5 MG/DL (ref 0.6–1.1)
EKG ATRIAL RATE: 59 BPM
EKG ATRIAL RATE: 62 BPM
EKG DIAGNOSIS: NORMAL
EKG DIAGNOSIS: NORMAL
EKG P AXIS: 15 DEGREES
EKG P AXIS: 26 DEGREES
EKG P-R INTERVAL: 134 MS
EKG P-R INTERVAL: 148 MS
EKG Q-T INTERVAL: 424 MS
EKG Q-T INTERVAL: 454 MS
EKG QRS DURATION: 66 MS
EKG QRS DURATION: 70 MS
EKG QTC CALCULATION (BAZETT): 419 MS
EKG QTC CALCULATION (BAZETT): 460 MS
EKG R AXIS: 3 DEGREES
EKG R AXIS: 7 DEGREES
EKG T AXIS: 10 DEGREES
EKG T AXIS: 10 DEGREES
EKG VENTRICULAR RATE: 59 BPM
EKG VENTRICULAR RATE: 62 BPM
EOSINOPHILS ABSOLUTE: 0 K/UL (ref 0–0.6)
EOSINOPHILS RELATIVE PERCENT: 0.4 %
GFR AFRICAN AMERICAN: >60
GFR NON-AFRICAN AMERICAN: >60
GLOBULIN: 3.8 G/DL
GLUCOSE BLD-MCNC: 100 MG/DL (ref 70–99)
HCT VFR BLD CALC: 33.9 % (ref 36–48)
HEMOGLOBIN: 11.4 G/DL (ref 12–16)
LYMPHOCYTES ABSOLUTE: 1 K/UL (ref 1–5.1)
LYMPHOCYTES RELATIVE PERCENT: 16.9 %
MAGNESIUM: 5.7 MG/DL (ref 1.8–2.4)
MCH RBC QN AUTO: 30.1 PG (ref 26–34)
MCHC RBC AUTO-ENTMCNC: 33.7 G/DL (ref 31–36)
MCV RBC AUTO: 89.3 FL (ref 80–100)
MONOCYTES ABSOLUTE: 0.7 K/UL (ref 0–1.3)
MONOCYTES RELATIVE PERCENT: 12.5 %
NEUTROPHILS ABSOLUTE: 4.2 K/UL (ref 1.7–7.7)
NEUTROPHILS RELATIVE PERCENT: 69.7 %
PDW BLD-RTO: 14.8 % (ref 12.4–15.4)
PH VENOUS: 7.5 (ref 7.35–7.45)
PLATELET # BLD: 397 K/UL (ref 135–450)
PMV BLD AUTO: 6.9 FL (ref 5–10.5)
POTASSIUM REFLEX MAGNESIUM: 3 MMOL/L (ref 3.5–5.1)
RBC # BLD: 3.8 M/UL (ref 4–5.2)
SODIUM BLD-SCNC: 137 MMOL/L (ref 136–145)
TOTAL PROTEIN: 7.2 G/DL (ref 6.4–8.2)
WBC # BLD: 6 K/UL (ref 4–11)

## 2021-10-28 PROCEDURE — 99233 SBSQ HOSP IP/OBS HIGH 50: CPT | Performed by: INTERNAL MEDICINE

## 2021-10-28 PROCEDURE — 82330 ASSAY OF CALCIUM: CPT

## 2021-10-28 PROCEDURE — 6370000000 HC RX 637 (ALT 250 FOR IP): Performed by: INTERNAL MEDICINE

## 2021-10-28 PROCEDURE — 80053 COMPREHEN METABOLIC PANEL: CPT

## 2021-10-28 PROCEDURE — 85025 COMPLETE CBC W/AUTO DIFF WBC: CPT

## 2021-10-28 PROCEDURE — 6370000000 HC RX 637 (ALT 250 FOR IP): Performed by: NURSE PRACTITIONER

## 2021-10-28 PROCEDURE — 93010 ELECTROCARDIOGRAM REPORT: CPT | Performed by: INTERNAL MEDICINE

## 2021-10-28 PROCEDURE — 36415 COLL VENOUS BLD VENIPUNCTURE: CPT

## 2021-10-28 PROCEDURE — 83735 ASSAY OF MAGNESIUM: CPT

## 2021-10-28 RX ORDER — POTASSIUM CHLORIDE 20 MEQ/1
60 TABLET, EXTENDED RELEASE ORAL 2 TIMES DAILY
Status: DISCONTINUED | OUTPATIENT
Start: 2021-10-28 | End: 2021-10-28 | Stop reason: HOSPADM

## 2021-10-28 RX ADMIN — ACETAMINOPHEN 650 MG: 325 TABLET ORAL at 08:34

## 2021-10-28 RX ADMIN — POTASSIUM CHLORIDE 60 MEQ: 20 TABLET, EXTENDED RELEASE ORAL at 08:34

## 2021-10-28 RX ADMIN — METFORMIN HYDROCHLORIDE 1 TABLET: 500 TABLET, EXTENDED RELEASE ORAL at 08:33

## 2021-10-28 RX ADMIN — FERROUS SULFATE TAB 325 MG (65 MG ELEMENTAL FE) 325 MG: 325 (65 FE) TAB at 08:34

## 2021-10-28 NOTE — PROGRESS NOTES
Hospitalist Progress Note      PCP: No primary care provider on file. Date of Admission: 10/26/2021    Chief Complaint: [facility] Course: ***     Subjective: ***       Medications:  Reviewed    Infusion Medications    sodium chloride       Scheduled Medications    potassium chloride  60 mEq Oral BID    sodium chloride flush  10 mL IntraVENous 2 times per day    ferrous sulfate  325 mg Oral Daily with breakfast    prenatal vitamin  1 tablet Oral Daily     PRN Meds: sodium chloride flush, sodium chloride, potassium chloride, magnesium sulfate, promethazine **OR** ondansetron, acetaminophen **OR** acetaminophen, labetalol      Intake/Output Summary (Last 24 hours) at 10/28/2021 0745  Last data filed at 10/28/2021 0421  Gross per 24 hour   Intake 2883.57 ml   Output 7850 ml   Net -4966.43 ml       Physical Exam Performed:    /87   Pulse 76   Temp 98.1 °F (36.7 °C) (Oral)   Resp 16   Ht 5' (1.524 m)   Wt 137 lb 8 oz (62.4 kg)   SpO2 98%   Breastfeeding No   BMI 26.85 kg/m²     General appearance: No apparent distress, appears stated age and cooperative. HEENT: Pupils equal, round, and reactive to light. Conjunctivae/corneas clear. Neck: Supple, with full range of motion. No jugular venous distention. Trachea midline. Respiratory:  Normal respiratory effort. Clear to auscultation, bilaterally without Rales/Wheezes/Rhonchi. Cardiovascular: Regular rate and rhythm with normal S1/S2 without murmurs, rubs or gallops. Abdomen: Soft, non-tender, non-distended with normal bowel sounds. Musculoskeletal: No clubbing, cyanosis or edema bilaterally. Full range of motion without deformity. Skin: Skin color, texture, turgor normal.  No rashes or lesions. Neurologic:  Neurovascularly intact without any focal sensory/motor deficits.  Cranial nerves: II-XII intact, grossly non-focal.  Psychiatric: Alert and oriented, thought content appropriate, normal insight  Capillary Refill: Brisk,3 seconds, normal   Peripheral Pulses: +2 palpable, equal bilaterally       Labs:   Recent Labs     10/26/21  2115 10/28/21  0631   WBC 7.9 6.0   HGB 10.5* 11.4*   HCT 31.7* 33.9*    397     Recent Labs     10/26/21  2115 10/28/21  0631   * 137   K 3.9 3.0*    97*   CO2 20* 25   BUN 16 11   CREATININE 0.6 <0.5*   CALCIUM 8.7 7.0*     Recent Labs     10/26/21  2115 10/28/21  0631   * 177*   * 134*   BILITOT <0.2 0.3   ALKPHOS 271* 256*     No results for input(s): INR in the last 72 hours. Recent Labs     10/26/21  2115 10/27/21  1200   TROPONINI <0.01 <0.01       Urinalysis:      Lab Results   Component Value Date    NITRU Negative 10/27/2021    WBCUA 0-2 10/27/2021    BACTERIA Rare 10/27/2021    RBCUA 5-10 10/27/2021    BLOODU MODERATE 10/27/2021    SPECGRAV <=1.005 10/27/2021    GLUCOSEU Negative 10/27/2021    GLUCOSEU NEGATIVE 01/09/2011       Radiology:  CT CHEST PULMONARY EMBOLISM W CONTRAST   Final Result   1. Congestive heart failure. 2. Mosaic attenuation in the lungs. Atelectasis, distal airway inflammation,   and early pulmonary edema are in the differential.   3. No acute pulmonary embolism to the segmental arteries. 4. Other findings as described. Assessment/Plan:    Active Hospital Problems    Diagnosis     Preeclampsia in postpartum period [O14.95]     SOB (shortness of breath) [R06.02]     COVID-19 [U07.1]     Elevated LFTs [R79.89]          DVT Prophylaxis: ***  Diet: ADULT DIET; Regular; 3 carb choices (45 gm/meal);  Low Fat/Low Chol/High Fiber/2 gm Na  Code Status: Full Code    PT/OT Eval Status: ***    Dispo - ***    Nikki Mckenna, APRN - CNP

## 2021-10-28 NOTE — PROGRESS NOTES
Patient reports feeling better. Appreciate Cardiology and Hospitalist management. Agree, Elevated BP, elevated LFTs likely related to Covid +  Recommend home monitoring of BPs. If SBP > 160 or DBP > 105, call office for further evaluation.

## 2021-10-28 NOTE — FLOWSHEET NOTE
Discharge Phone Call Log  Patient Name: Evonnie Hashimoto     [facility] Care Provider: Vera Clements MD Discharge Date: 10/28/2021    Disposition of baby:    Phone Number: [phone] (home)     Attempts to Contact:  Date:    Nurse  Date:    Nurse  Date:    Nurse    1. Now that you are at home is your pain being well controlled? Y/N   What pain reducing measures are you using? ____________________________________        Information for the patient's :  Kaleigh Jaquez [[id number]]   Delivery Method: Vaginal, Spontaneous     2. Are you currently  having any infant feeding issues? Y/N _____________________________ If yes, please explain: __________________________________________________________________  3. If breastfeeding, were you satisfied with the breastfeeding support services offered? Y/N  4.  Have you had to supplement? Y/N If yes, please explain: _____________________________________________________       Did you supplement while in the hospital, or begin formula supplementation at home?________________________________________  5. Did your OB provider offer you information about the benefits of breastfeeding during your prenatal visits? Y/N  6.  Have you made or have you already had your first appointment with the baby's doctor? Y/N If no, do you know when to schedule it? Y/N   7.  Have you scheduled your follow-up appointment? Y/N  If no, do you know when to schedule it? Y/N  8. Did staff discuss safe sleep during your stay? Y/N  Did you see the wall cling posted in your room explaining how to keep you and your baby safe? Y/N  10. Did your nurses and physicians include you in the plan of care, communicating with you respectfully? Y/N If no, please explain __________________________  11. Is there anyone in particular you would like to mention who provided care for you? ________________________________  12. Did your discharge occur in a timely manner?   Y/N If no, please

## 2021-10-28 NOTE — PROGRESS NOTES
rhonchi. Abdomen: Soft, non-tender. Normoactive bowel sounds. No masses or organomegaly. Skin: No rashes, wounds, or lesions. Pulses: 2+ and symmetric. Extremities: No clubbing, cyanosis, or edema. Psych: Normal mood and affect. Neuro: Alert and oriented to person, place, and time. No focal deficits noted. Labs   CBC:   Lab Results   Component Value Date    WBC 6.0 10/28/2021    RBC 3.80 10/28/2021    HGB 11.4 10/28/2021    HCT 33.9 10/28/2021    MCV 89.3 10/28/2021    RDW 14.8 10/28/2021     10/28/2021     CMP:  Lab Results   Component Value Date     10/28/2021    K 3.0 10/28/2021    CL 97 10/28/2021    CO2 25 10/28/2021    BUN 11 10/28/2021    CREATININE <0.5 10/28/2021    GFRAA >60 10/28/2021    GFRAA >60 11/16/2012    AGRATIO 0.9 10/28/2021    LABGLOM >60 10/28/2021    GLUCOSE 100 10/28/2021    PROT 7.2 10/28/2021    CALCIUM 7.0 10/28/2021    BILITOT 0.3 10/28/2021    ALKPHOS 256 10/28/2021     10/28/2021     10/28/2021     PT/INR:  No results found for: PTINR  HgBA1c:No results found for: LABA1C  Lab Results   Component Value Date    TROPONINI <0.01 10/27/2021         Cardiac Data     TTE 10/26/21:  Conclusions   Summary   --Low normal left ventricular systolic function with ejection fraction of   55%. No regional wall motion abnormalites are seen. Left ventricular   diastolic filling pressure is normal.   Trace - mild mitral and tricuspid regurgitation. Assessment:      1. Atypical chest pain/exertional dyspnea  2. COVID-19 pneumonia  3. Elevated BP  4. S/p vaginal delivery 10/22/21  5. Transamanitis    Plan:     1. Overall, patient remained stable over the last 24 hours, BP is now controlled, and her chest pain and dyspnea appear to have resolved. Her TTE showed preserved biventricular systolic function and she clinically appears to be near an intravascularly euvolemic state. Consecutive troponin T levels were negative over a >12 hour period.   At this point, her atypical chest pain and shortness of breath are felt to most likely be attributable to her underlying COVID-19 infection as opposed to a primary cardiac etiology. Nevertheless, myocarditis and/or pericarditis are not uncommonly seen in the acute or post-viral phase of COVID-19 infection and close follow-up with cardiology is recommended. 2. Given improvement in BP, there is no strong indication in initiating antihypertensive therapy at this time, but will again recommend close follow-up for further monitoring. 3. OK for discharge home later today from cardiology standpoint. Will arrange for follow-up with [facility] in 2-4 weeks. If there is additional concern for myocardial involvement in the future, can repeat inflammatory markers and consider obtaining cardiac MRI for additional evaluation. Thank you for allowing us to participate in the care of Shannan Gill. Please call me with any questions [phone]. Jessee Moore,   [facility]  [phone] [facility]  [phone] [address] [place] [address]  10/28/2021 11:04 AM      I will address the patient's cardiac risk factors and adjusted pharmacologic treatment as needed. In addition, I have reinforced the need for patient directed risk factor modification. All questions and concerns were addressed to the patient/family. Alternatives to my treatment were discussed. The note was completed using EMR. Every effort was made to ensure accuracy; however, inadvertent computerized transcription errors may be present.

## 2021-10-28 NOTE — DISCHARGE SUMMARY
Hospital Medicine Discharge Summary    Patient ID: Evonnie Hashimoto      Patient's PCP: No primary care provider on file. Admit Date: 10/26/2021     Discharge Date:   10/28/21    Admitting Physician: Vera Clements MD     Discharge Physician: SUMA Torres - CNP     Discharge Diagnoses: Active Hospital Problems    Diagnosis     Preeclampsia in postpartum period [O14.95]     SOB (shortness of breath) [R06.02]     COVID-19 [U07.1]     Elevated LFTs [R79.89]        The patient was seen and examined on day of discharge and this discharge summary is in conjunction with any daily progress note from day of discharge. Hospital Course:     27 y.o. female healthy, functional independent  who presented to [facility] with chief complaint of shortness of breath.     Patient reported that she had uncomplicated deliveries. Patient reported that her most recent delivery was vaginal and did not require epidural.  Patient reported that she been having progressive shortness of breath worsening edema exacerbated on exertion alleviated. Patient denied prior history of eclampsia, family history eclampsia or cardiomyopathy. Patient reported mother had hypertension. Patient denies smoking drinking or drugs. Patient denied been stressed with delivery however no other concern. Patient also reported some lower extremity swelling progressively worsening with exertion alleviated with rest      Postpartum preeclampsia:  Patient presented with hypertension, transaminitis, proteinuria postpartum  Labetalol as needed for BP medication, magnesium 1 g IV infusion, seizure precaution  Plan is to admit to medicine with OB/GYN consultation   - BP controlled, labs improved        Suspected postpartum cardiomyopathy:  Sx currently could be explained by Pre-eclampsia.   Echocardiogram reassuring  cardiology consultation     Normocytic anemia,  LDH, haptoglobin for suspected hemolysis     Transaminitis: Etiology Pre-eclampsia;  Continue to monitor, and trend     Proteinuria: non-nephrotic range. Etiology Pre-eclampsia;  Continue to monitor, and trend         Physical Exam Performed:     BP (!) 141/91   Pulse 67   Temp 98.1 °F (36.7 °C) (Oral)   Resp 18   Ht 5' (1.524 m)   Wt 137 lb 8 oz (62.4 kg)   SpO2 99%   Breastfeeding No   BMI 26.85 kg/m²       General appearance:  No apparent distress, appears stated age and cooperative. HEENT:  Normal cephalic, atraumatic without obvious deformity. Pupils equal, round, and reactive to light. Extra ocular muscles intact. Conjunctivae/corneas clear. Neck: Supple, with full range of motion. No jugular venous distention. Trachea midline. Respiratory:  Normal respiratory effort. Clear to auscultation, bilaterally without Rales/Wheezes/Rhonchi. Cardiovascular:  Regular rate and rhythm with normal S1/S2 without murmurs, rubs or gallops. Abdomen: Soft, non-tender, non-distended with normal bowel sounds. Musculoskeletal:  No clubbing, cyanosis or edema bilaterally. Full range of motion without deformity. Skin: Skin color, texture, turgor normal.  No rashes or lesions. Neurologic:  Neurovascularly intact without any focal sensory/motor deficits. Cranial nerves: II-XII intact, grossly non-focal.  Psychiatric:  Alert and oriented, thought content appropriate, normal insight  Capillary Refill: Brisk,< 3 seconds   Peripheral Pulses: +2 palpable, equal bilaterally       Labs:  For convenience and continuity at follow-up the following most recent labs are provided:      CBC:    Lab Results   Component Value Date    WBC 6.0 10/28/2021    HGB 11.4 10/28/2021    HCT 33.9 10/28/2021     10/28/2021       Renal:    Lab Results   Component Value Date     10/28/2021    K 3.0 10/28/2021    CL 97 10/28/2021    CO2 25 10/28/2021    BUN 11 10/28/2021    CREATININE <0.5 10/28/2021    CALCIUM 7.0 10/28/2021         Significant Diagnostic Studies    Radiology:   CT CHEST PULMONARY EMBOLISM W CONTRAST   Final Result   1. Congestive heart failure. 2. Mosaic attenuation in the lungs. Atelectasis, distal airway inflammation,   and early pulmonary edema are in the differential.   3. No acute pulmonary embolism to the segmental arteries. 4. Other findings as described. Consults:     IP CONSULT TO HOSPITALIST  IP CONSULT TO HOSPITALIST  IP CONSULT TO OB GYN  IP CONSULT TO HEART FAILURE NURSE/COORDINATOR  IP CONSULT TO DIETITIAN  IP CONSULT TO CARDIOLOGY    Disposition:  Home   Condition at Discharge: Stable    Discharge Instructions/Follow-up:  OB/gyn, pcp    Code Status:  Full Code     Activity: activity as tolerated    Diet: regular diet      Discharge Medications:     Current Discharge Medication List           Details   ferrous sulfate (IRON 325) 325 (65 Fe) MG tablet Take 325 mg by mouth daily (with breakfast)      Prenatal Vit-Fe Fumarate-FA (PRENATAL MULTIVITAMIN) 27-1 MG TABS tablet Take 1 tablet by mouth daily. Time Spent on discharge is more than 30 minutes in the examination, evaluation, counseling and review of medications and discharge plan. Signed:    SUMA Apple CNP   10/28/2021      Thank you No primary care provider on file. for the opportunity to be involved in this patient's care. If you have any questions or concerns please feel free to contact me at [phone].

## 2021-10-28 NOTE — PLAN OF CARE
Joceline Falcon RN  Outcome: Ongoing  Goal: Ability to achieve and maintain adequate urine output will improve  10/28/2021 0411 by Cortney Pantoja RN  Outcome: Ongoing  10/27/2021 1944 by Luciano Arroyo RN  Outcome: Ongoing  Goal: Peripheral tissue perfusion will improve  10/28/2021 0411 by Cortney Pantoja RN  Outcome: Ongoing  10/27/2021 1944 by Luciano Arroyo RN  Outcome: Ongoing     Problem: Life Cycle:  Goal: Chance of risk for complications during labor will decrease  10/28/2021 0411 by Cortney Pantoja RN  Outcome: Ongoing  10/27/2021 1944 by Luciano Arroyo RN  Outcome: Ongoing  Goal: Will show no signs and symptoms of excessive bleeding  10/28/2021 0411 by Cortney Pantoja RN  Outcome: Ongoing  10/27/2021 1944 by Luciano Arroyo RN  Outcome: Ongoing     Problem: Physical Regulation:  Goal: Risk for medication side effects will decrease  10/28/2021 0411 by Cortney Pantoja RN  Outcome: Ongoing  10/27/2021 1944 by Luciano Arroyo RN  Outcome: Ongoing  Goal: Will remain free of preeclampsia complications  33/48/5172 0411 by Cortney Pantoja RN  Outcome: Ongoing  10/27/2021 1944 by Luciano Arroyo RN  Outcome: Ongoing  Goal: Signs of adequate cerebral perfusion will increase  10/28/2021 0411 by Cortney Pantoja RN  Outcome: Ongoing  10/27/2021 1944 by Luciano Arroyo RN  Outcome: Ongoing     Problem: Sensory:  Goal: Ability to compensate for vision loss will improve  10/28/2021 0411 by Cortney Pantoja RN  Outcome: Ongoing  10/27/2021 1944 by Luciano Arroyo RN  Outcome: Ongoing     Problem: Airway Clearance - Ineffective  Goal: Achieve or maintain patent airway  10/28/2021 0411 by Cortney Pantoja RN  Outcome: Ongoing  10/27/2021 1944 by Luciano Arroyo RN  Outcome: Ongoing     Problem: Gas Exchange - Impaired  Goal: Absence of hypoxia  10/28/2021 0411 by Cortney Pantoja RN  Outcome: Ongoing  10/27/2021 1944 by Luciano Arroyo RN  Outcome: Ongoing  Goal: Promote optimal lung function  10/28/2021 0411 by Arturo Kimble RN  Outcome: Ongoing  10/27/2021 1944 by Dale Power RN  Outcome: Ongoing     Problem: Breathing Pattern - Ineffective  Goal: Ability to achieve and maintain a regular respiratory rate  10/28/2021 0411 by Arturo Kimble RN  Outcome: Ongoing  10/27/2021 1944 by Dale Power RN  Outcome: Ongoing     Problem:  Body Temperature -  Risk of, Imbalanced  Goal: Ability to maintain a body temperature within defined limits  10/28/2021 0411 by Arturo Kimble RN  Outcome: Ongoing  10/27/2021 1944 by Dale Power RN  Outcome: Ongoing  Goal: Will regain or maintain usual level of consciousness  10/28/2021 0411 by Arturo Kimble RN  Outcome: Ongoing  10/27/2021 1944 by Dale Power RN  Outcome: Ongoing  Goal: Complications related to the disease process, condition or treatment will be avoided or minimized  10/28/2021 0411 by Arturo Kimble RN  Outcome: Ongoing  10/27/2021 1944 by Dale Power RN  Outcome: Ongoing     Problem: Isolation Precautions - Risk of Spread of Infection  Goal: Prevent transmission of infection  10/28/2021 0411 by Arturo Kimble RN  Outcome: Ongoing  10/27/2021 1944 by Dale Power RN  Outcome: Ongoing     Problem: Nutrition Deficits  Goal: Optimize nutritional status  10/28/2021 0411 by Arturo Kimble RN  Outcome: Ongoing  10/27/2021 1944 by Dale Power RN  Outcome: Ongoing     Problem: Risk for Fluid Volume Deficit  Goal: Maintain normal heart rhythm  10/28/2021 0411 by Arturo Kimble RN  Outcome: Ongoing  10/27/2021 1944 by Dale Power RN  Outcome: Ongoing  Goal: Maintain absence of muscle cramping  10/28/2021 0411 by Arturo Kimble RN  Outcome: Ongoing  10/27/2021 1944 by Dale Power RN  Outcome: Ongoing  Goal: Maintain normal serum potassium, sodium, calcium, phosphorus, and pH  10/28/2021 0411 by Gearldean Gave, RN  Outcome: Ongoing  10/27/2021 1944 by Rafael Saldana RN  Outcome: Ongoing     Problem: Loneliness or Risk for Loneliness  Goal: Demonstrate positive use of time alone when socialization is not possible  10/28/2021 0411 by Winston Valles RN  Outcome: Ongoing  10/27/2021 1944 by Rafael Saldana RN  Outcome: Ongoing     Problem: Fatigue  Goal: Verbalize increase energy and improved vitality  10/28/2021 0411 by Winston Valles RN  Outcome: Ongoing  10/27/2021 1944 by Rafael Saldana RN  Outcome: Ongoing     Problem: Patient Education: Go to Patient Education Activity  Goal: Patient/Family Education  10/28/2021 0411 by Winston Valles RN  Outcome: Ongoing  10/27/2021 1944 by Rafael Saldana RN  Outcome: Ongoing     Problem: Pain:  Goal: Pain level will decrease  Outcome: Ongoing  Goal: Control of acute pain  Outcome: Ongoing  Goal: Control of chronic pain  Outcome: Ongoing

## 2021-10-29 ENCOUNTER — FOLLOWUP TELEPHONE ENCOUNTER (OUTPATIENT)
Dept: TELEMETRY | Age: 31
End: 2021-10-29

## 2021-10-29 NOTE — TELEPHONE ENCOUNTER
1st Attempt; poor connection. Pt unable to hear writer. Will attempt again at a later time with better connection. Kaley Cameron RN .

## 2021-10-29 NOTE — TELEPHONE ENCOUNTER
Spoke to patient for hospital follow up. Pt states she is doing well since returning home and denies any needs. States she had good care while admitted.   Leonel Fox RN

## 2025-02-21 ENCOUNTER — OFFICE VISIT (OUTPATIENT)
Dept: FAMILY MEDICINE CLINIC | Age: 35
End: 2025-02-21

## 2025-02-21 VITALS
RESPIRATION RATE: 14 BRPM | HEIGHT: 60 IN | BODY MASS INDEX: 28.86 KG/M2 | DIASTOLIC BLOOD PRESSURE: 64 MMHG | HEART RATE: 89 BPM | WEIGHT: 147 LBS | SYSTOLIC BLOOD PRESSURE: 116 MMHG | OXYGEN SATURATION: 99 % | TEMPERATURE: 97.8 F

## 2025-02-21 DIAGNOSIS — F41.1 GAD (GENERALIZED ANXIETY DISORDER): ICD-10-CM

## 2025-02-21 DIAGNOSIS — R53.83 OTHER FATIGUE: ICD-10-CM

## 2025-02-21 DIAGNOSIS — Z00.00 ENCOUNTER FOR MEDICAL EXAMINATION TO ESTABLISH CARE: Primary | ICD-10-CM

## 2025-02-21 DIAGNOSIS — R45.84 ANHEDONIA: ICD-10-CM

## 2025-02-21 RX ORDER — ESCITALOPRAM OXALATE 10 MG/1
10 TABLET ORAL DAILY
Qty: 30 TABLET | Refills: 0 | Status: SHIPPED | OUTPATIENT
Start: 2025-02-21

## 2025-02-21 SDOH — ECONOMIC STABILITY: FOOD INSECURITY: WITHIN THE PAST 12 MONTHS, THE FOOD YOU BOUGHT JUST DIDN'T LAST AND YOU DIDN'T HAVE MONEY TO GET MORE.: NEVER TRUE

## 2025-02-21 SDOH — ECONOMIC STABILITY: FOOD INSECURITY: WITHIN THE PAST 12 MONTHS, YOU WORRIED THAT YOUR FOOD WOULD RUN OUT BEFORE YOU GOT MONEY TO BUY MORE.: NEVER TRUE

## 2025-02-21 SDOH — HEALTH STABILITY: PHYSICAL HEALTH: ON AVERAGE, HOW MANY MINUTES DO YOU ENGAGE IN EXERCISE AT THIS LEVEL?: 0 MIN

## 2025-02-21 SDOH — HEALTH STABILITY: PHYSICAL HEALTH: ON AVERAGE, HOW MANY DAYS PER WEEK DO YOU ENGAGE IN MODERATE TO STRENUOUS EXERCISE (LIKE A BRISK WALK)?: 0 DAYS

## 2025-02-21 ASSESSMENT — ENCOUNTER SYMPTOMS
DIARRHEA: 0
ABDOMINAL PAIN: 0
SHORTNESS OF BREATH: 0
COUGH: 0
CONSTIPATION: 0
SORE THROAT: 0
NAUSEA: 0

## 2025-02-21 ASSESSMENT — PATIENT HEALTH QUESTIONNAIRE - PHQ9
SUM OF ALL RESPONSES TO PHQ9 QUESTIONS 1 & 2: 0
SUM OF ALL RESPONSES TO PHQ QUESTIONS 1-9: 0
2. FEELING DOWN, DEPRESSED OR HOPELESS: NOT AT ALL
SUM OF ALL RESPONSES TO PHQ QUESTIONS 1-9: 0
SUM OF ALL RESPONSES TO PHQ QUESTIONS 1-9: 0
1. LITTLE INTEREST OR PLEASURE IN DOING THINGS: NOT AT ALL
SUM OF ALL RESPONSES TO PHQ QUESTIONS 1-9: 0

## 2025-02-21 ASSESSMENT — ANXIETY QUESTIONNAIRES
3. WORRYING TOO MUCH ABOUT DIFFERENT THINGS: NEARLY EVERY DAY
2. NOT BEING ABLE TO STOP OR CONTROL WORRYING: NEARLY EVERY DAY
1. FEELING NERVOUS, ANXIOUS, OR ON EDGE: NEARLY EVERY DAY
4. TROUBLE RELAXING: NEARLY EVERY DAY
6. BECOMING EASILY ANNOYED OR IRRITABLE: MORE THAN HALF THE DAYS
GAD7 TOTAL SCORE: 17
7. FEELING AFRAID AS IF SOMETHING AWFUL MIGHT HAPPEN: SEVERAL DAYS
5. BEING SO RESTLESS THAT IT IS HARD TO SIT STILL: MORE THAN HALF THE DAYS
IF YOU CHECKED OFF ANY PROBLEMS ON THIS QUESTIONNAIRE, HOW DIFFICULT HAVE THESE PROBLEMS MADE IT FOR YOU TO DO YOUR WORK, TAKE CARE OF THINGS AT HOME, OR GET ALONG WITH OTHER PEOPLE: VERY DIFFICULT

## 2025-02-21 NOTE — PROGRESS NOTES
2/21/2025    History of Present Illness  The patient presents for evaluation of anxiety.    She reports a lack of energy and a general feeling of malaise over the past 2 years, which is a significant change from her previously outgoing and happy demeanor. She describes a need for self-motivation to accomplish tasks and frequent feelings of sadness. She has been experiencing sleep disturbances, characterized by frequent awakenings throughout the night to check on her children, a pattern that has persisted since the birth of her first child 14 years ago. She does not take any medications, including over-the-counter analgesics such as Tylenol or ibuprofen, but expresses a desire to return to her normal state of health and energy levels.    She has not had any recent laboratory work and is not aware of any chronic health conditions. Her obstetric history includes 5 pregnancies, 2 of which resulted in early miscarriages. She experienced iron deficiency during her second pregnancy, necessitating iron supplementation, but did not require this during her third pregnancy. She also reports no current issues with iron deficiency.    This is a 34 y.o. female   Chief Complaint   Patient presents with    Established New Doctor    Anxiety   .    HPI     Past Medical History:   Diagnosis Date    COVID-19 10/27/2021    Rh incompatibility     Shoulder dystocia, delivered     G4       Past Surgical History:   Procedure Laterality Date    ABDOMEN SURGERY      D&C for SAB 2007       Social History     Socioeconomic History    Marital status:      Spouse name: Not on file    Number of children: Not on file    Years of education: Not on file    Highest education level: Not on file   Occupational History    Not on file   Tobacco Use    Smoking status: Never    Smokeless tobacco: Never   Vaping Use    Vaping status: Never Used   Substance and Sexual Activity    Alcohol use: No    Drug use: No    Sexual activity: Yes     Partners:

## 2025-02-22 DIAGNOSIS — Z00.00 ENCOUNTER FOR MEDICAL EXAMINATION TO ESTABLISH CARE: ICD-10-CM

## 2025-02-22 DIAGNOSIS — R45.84 ANHEDONIA: ICD-10-CM

## 2025-02-22 DIAGNOSIS — R53.83 OTHER FATIGUE: ICD-10-CM

## 2025-02-22 LAB
25(OH)D3 SERPL-MCNC: 6.1 NG/ML
ALBUMIN SERPL-MCNC: 3.3 G/DL (ref 3.4–5)
ALBUMIN/GLOB SERPL: 1.4 {RATIO} (ref 1.1–2.2)
ALP SERPL-CCNC: 54 U/L (ref 40–129)
ALT SERPL-CCNC: 29 U/L (ref 10–40)
ANION GAP SERPL CALCULATED.3IONS-SCNC: 15 MMOL/L (ref 3–16)
AST SERPL-CCNC: 30 U/L (ref 15–37)
BASOPHILS # BLD: 0 K/UL (ref 0–0.2)
BASOPHILS NFR BLD: 0.4 %
BILIRUB SERPL-MCNC: <0.2 MG/DL (ref 0–1)
BUN SERPL-MCNC: 10 MG/DL (ref 7–20)
CALCIUM SERPL-MCNC: 6.8 MG/DL (ref 8.3–10.6)
CHLORIDE SERPL-SCNC: 98 MMOL/L (ref 99–110)
CHOLEST SERPL-MCNC: 141 MG/DL (ref 0–199)
CO2 SERPL-SCNC: 20 MMOL/L (ref 21–32)
CREAT SERPL-MCNC: 0.5 MG/DL (ref 0.6–1.1)
DEPRECATED RDW RBC AUTO: 13.3 % (ref 12.4–15.4)
EOSINOPHIL # BLD: 0.1 K/UL (ref 0–0.6)
EOSINOPHIL NFR BLD: 1 %
FOLATE SERPL-MCNC: 4.74 NG/ML (ref 4.78–24.2)
GFR SERPLBLD CREATININE-BSD FMLA CKD-EPI: >90 ML/MIN/{1.73_M2}
GLUCOSE SERPL-MCNC: 63 MG/DL (ref 70–99)
HCT VFR BLD AUTO: 40.2 % (ref 36–48)
HDLC SERPL-MCNC: 42 MG/DL (ref 40–60)
HGB BLD-MCNC: 13.8 G/DL (ref 12–16)
LDL CHOLESTEROL: 91 MG/DL
LYMPHOCYTES # BLD: 1.8 K/UL (ref 1–5.1)
LYMPHOCYTES NFR BLD: 23.4 %
MCH RBC QN AUTO: 31.1 PG (ref 26–34)
MCHC RBC AUTO-ENTMCNC: 34.2 G/DL (ref 31–36)
MCV RBC AUTO: 91.1 FL (ref 80–100)
MONOCYTES # BLD: 0.6 K/UL (ref 0–1.3)
MONOCYTES NFR BLD: 7.5 %
NEUTROPHILS # BLD: 5.2 K/UL (ref 1.7–7.7)
NEUTROPHILS NFR BLD: 67.7 %
PLATELET # BLD AUTO: 313 K/UL (ref 135–450)
PMV BLD AUTO: 8.5 FL (ref 5–10.5)
POTASSIUM SERPL-SCNC: 4.6 MMOL/L (ref 3.5–5.1)
PROT SERPL-MCNC: 5.6 G/DL (ref 6.4–8.2)
RBC # BLD AUTO: 4.42 M/UL (ref 4–5.2)
SODIUM SERPL-SCNC: 133 MMOL/L (ref 136–145)
TRIGL SERPL-MCNC: 41 MG/DL (ref 0–150)
TSH SERPL DL<=0.005 MIU/L-ACNC: 2.33 UIU/ML (ref 0.27–4.2)
VIT B12 SERPL-MCNC: 410 PG/ML (ref 211–911)
VLDLC SERPL CALC-MCNC: 8 MG/DL
WBC # BLD AUTO: 7.6 K/UL (ref 4–11)

## 2025-02-24 DIAGNOSIS — E55.9 VITAMIN D DEFICIENCY: Primary | ICD-10-CM

## 2025-02-24 RX ORDER — ERGOCALCIFEROL 1.25 MG/1
50000 CAPSULE, LIQUID FILLED ORAL WEEKLY
Qty: 12 CAPSULE | Refills: 1 | Status: SHIPPED | OUTPATIENT
Start: 2025-02-24

## 2025-03-19 DIAGNOSIS — F41.1 GAD (GENERALIZED ANXIETY DISORDER): ICD-10-CM

## 2025-03-19 RX ORDER — ESCITALOPRAM OXALATE 10 MG/1
10 TABLET ORAL DAILY
Qty: 30 TABLET | Refills: 0 | Status: SHIPPED | OUTPATIENT
Start: 2025-03-19

## 2025-03-19 NOTE — TELEPHONE ENCOUNTER
Future Appointments   Date Time Provider Department Center   3/21/2025 11:40 AM Snehal Zelaya DO [facility]     LOV 2/21/2025

## 2025-03-21 ENCOUNTER — OFFICE VISIT (OUTPATIENT)
Dept: FAMILY MEDICINE CLINIC | Age: 35
End: 2025-03-21
Payer: COMMERCIAL

## 2025-03-21 VITALS
BODY MASS INDEX: 28.12 KG/M2 | WEIGHT: 144 LBS | HEART RATE: 61 BPM | TEMPERATURE: 97.8 F | DIASTOLIC BLOOD PRESSURE: 76 MMHG | OXYGEN SATURATION: 100 % | SYSTOLIC BLOOD PRESSURE: 100 MMHG

## 2025-03-21 DIAGNOSIS — R94.8 ABNORMAL METABOLIC FUNCTION: ICD-10-CM

## 2025-03-21 DIAGNOSIS — F41.1 GAD (GENERALIZED ANXIETY DISORDER): Primary | ICD-10-CM

## 2025-03-21 LAB
ALBUMIN SERPL-MCNC: 4.5 G/DL (ref 3.4–5)
ALBUMIN/GLOB SERPL: 1.5 {RATIO} (ref 1.1–2.2)
ALP SERPL-CCNC: 63 U/L (ref 40–129)
ALT SERPL-CCNC: 29 U/L (ref 10–40)
ANION GAP SERPL CALCULATED.3IONS-SCNC: 8 MMOL/L (ref 3–16)
AST SERPL-CCNC: 28 U/L (ref 15–37)
BILIRUB SERPL-MCNC: <0.2 MG/DL (ref 0–1)
BUN SERPL-MCNC: 13 MG/DL (ref 7–20)
CALCIUM SERPL-MCNC: 9.6 MG/DL (ref 8.3–10.6)
CHLORIDE SERPL-SCNC: 103 MMOL/L (ref 99–110)
CO2 SERPL-SCNC: 25 MMOL/L (ref 21–32)
CREAT SERPL-MCNC: 0.7 MG/DL (ref 0.6–1.1)
GFR SERPLBLD CREATININE-BSD FMLA CKD-EPI: >90 ML/MIN/{1.73_M2}
GLUCOSE SERPL-MCNC: 82 MG/DL (ref 70–99)
POTASSIUM SERPL-SCNC: 4.4 MMOL/L (ref 3.5–5.1)
PROT SERPL-MCNC: 7.6 G/DL (ref 6.4–8.2)
SODIUM SERPL-SCNC: 136 MMOL/L (ref 136–145)

## 2025-03-21 PROCEDURE — 99213 OFFICE O/P EST LOW 20 MIN: CPT | Performed by: SURGERY

## 2025-03-21 ASSESSMENT — ANXIETY QUESTIONNAIRES
4. TROUBLE RELAXING: NOT AT ALL
7. FEELING AFRAID AS IF SOMETHING AWFUL MIGHT HAPPEN: NOT AT ALL
GAD7 TOTAL SCORE: 3
6. BECOMING EASILY ANNOYED OR IRRITABLE: SEVERAL DAYS
5. BEING SO RESTLESS THAT IT IS HARD TO SIT STILL: NOT AT ALL
2. NOT BEING ABLE TO STOP OR CONTROL WORRYING: SEVERAL DAYS
3. WORRYING TOO MUCH ABOUT DIFFERENT THINGS: NOT AT ALL
1. FEELING NERVOUS, ANXIOUS, OR ON EDGE: SEVERAL DAYS

## 2025-03-21 NOTE — PROGRESS NOTES
3/21/2025    History of Present Illness  The patient presents for evaluation of anxiety and vitamin D deficiency.    She reports a positive response to Lexapro, with a significant improvement in her anxiety symptoms. She experiences feelings of nervousness, anxiety, and restlessness on several days but does not report any difficulty in controlling her worries or excessive worrying about various issues. She does not have any issues with relaxation or sitting still. However, she does experience irritability on several days. She does not have any feelings of fear or impending doom. She has already procured her medication refill from the pharmacy.    She has initiated a regimen of vitamin D supplementation.    Her metabolic panel was abnormal. She has made dietary modifications, including the elimination of soda and the incorporation of water as her primary beverage. She occasionally consumes coffee. She reports a decrease in energy levels this week, which she attributes to a lack of motivation due to her current work hiatus.    MEDICATIONS  Lexapro, vitamin D    This is a 34 y.o. female   Chief Complaint   Patient presents with    1 Month Follow-Up   .    Vit d low, cmp looks diluted - gatorade or other electrolyte drink  Started lexapro a month ago         Patient Active Problem List   Diagnosis    Abdominal pain complicating pregnancy, antepartum    Active labor    Vaginal delivery    Shoulder dystocia, delivered, current hospitalization    Preeclampsia in postpartum period    SOB (shortness of breath)    COVID-19    Elevated LFTs    XANDER (generalized anxiety disorder)       Current Outpatient Medications   Medication Sig Dispense Refill    escitalopram (LEXAPRO) 10 MG tablet TAKE 1 TABLET BY MOUTH DAILY 30 tablet 0    vitamin D (ERGOCALCIFEROL) 1.25 MG (66240 UT) CAPS capsule Take 1 capsule by mouth once a week 12 capsule 1     No current facility-administered medications for this visit.       No Known

## 2025-03-24 ENCOUNTER — RESULTS FOLLOW-UP (OUTPATIENT)
Dept: FAMILY MEDICINE CLINIC | Age: 35
End: 2025-03-24

## 2025-04-11 DIAGNOSIS — F41.1 GAD (GENERALIZED ANXIETY DISORDER): ICD-10-CM

## 2025-04-11 NOTE — TELEPHONE ENCOUNTER
Future Appointments   Date Time Provider Department Center   8/22/2025 11:20 AM Snehal Zelaya DO [facility]     LOV 3/21/2025

## 2025-04-14 RX ORDER — ESCITALOPRAM OXALATE 10 MG/1
10 TABLET ORAL DAILY
Qty: 90 TABLET | Refills: 3 | Status: SHIPPED | OUTPATIENT
Start: 2025-04-14

## 2025-05-22 ENCOUNTER — PATIENT MESSAGE (OUTPATIENT)
Dept: FAMILY MEDICINE CLINIC | Age: 35
End: 2025-05-22

## 2025-05-22 DIAGNOSIS — F41.1 GAD (GENERALIZED ANXIETY DISORDER): ICD-10-CM

## 2025-05-23 RX ORDER — ESCITALOPRAM OXALATE 20 MG/1
20 TABLET ORAL DAILY
Qty: 90 TABLET | Refills: 0 | Status: SHIPPED | OUTPATIENT
Start: 2025-05-23

## 2025-08-11 DIAGNOSIS — E55.9 VITAMIN D DEFICIENCY: ICD-10-CM

## 2025-08-11 RX ORDER — ERGOCALCIFEROL 1.25 MG/1
50000 CAPSULE, LIQUID FILLED ORAL WEEKLY
Qty: 12 CAPSULE | Refills: 1 | Status: SHIPPED | OUTPATIENT
Start: 2025-08-11

## 2025-08-22 ENCOUNTER — OFFICE VISIT (OUTPATIENT)
Dept: FAMILY MEDICINE CLINIC | Age: 35
End: 2025-08-22
Payer: COMMERCIAL

## 2025-08-22 VITALS
BODY MASS INDEX: 28.71 KG/M2 | RESPIRATION RATE: 16 BRPM | DIASTOLIC BLOOD PRESSURE: 70 MMHG | SYSTOLIC BLOOD PRESSURE: 103 MMHG | WEIGHT: 147 LBS | OXYGEN SATURATION: 99 % | TEMPERATURE: 97.6 F | HEART RATE: 77 BPM

## 2025-08-22 DIAGNOSIS — E53.8 FOLATE DEFICIENCY: ICD-10-CM

## 2025-08-22 DIAGNOSIS — E55.9 VITAMIN D DEFICIENCY: Primary | ICD-10-CM

## 2025-08-22 PROCEDURE — 99213 OFFICE O/P EST LOW 20 MIN: CPT | Performed by: SURGERY

## 2025-08-22 ASSESSMENT — ENCOUNTER SYMPTOMS
DIARRHEA: 0
NAUSEA: 0
SHORTNESS OF BREATH: 0
SORE THROAT: 0
COUGH: 0
CONSTIPATION: 0
ABDOMINAL PAIN: 0

## 2025-08-22 ASSESSMENT — PATIENT HEALTH QUESTIONNAIRE - PHQ9
SUM OF ALL RESPONSES TO PHQ QUESTIONS 1-9: 0
SUM OF ALL RESPONSES TO PHQ QUESTIONS 1-9: 0
2. FEELING DOWN, DEPRESSED OR HOPELESS: NOT AT ALL
SUM OF ALL RESPONSES TO PHQ QUESTIONS 1-9: 0
1. LITTLE INTEREST OR PLEASURE IN DOING THINGS: NOT AT ALL
SUM OF ALL RESPONSES TO PHQ QUESTIONS 1-9: 0